# Patient Record
Sex: FEMALE | Race: BLACK OR AFRICAN AMERICAN
[De-identification: names, ages, dates, MRNs, and addresses within clinical notes are randomized per-mention and may not be internally consistent; named-entity substitution may affect disease eponyms.]

---

## 2017-03-05 NOTE — EKG
Date Performed: 03/05/2017       Time Performed: 02:24:08

 

PTAGE:      56 years

 

EKG:      Sinus rhythm 

 

 WITH OCCASIONAL SUPRAVENTRICULAR PREMATURE COMPLEXES LOW QRS VOLTAGE IN PRECORDIAL LEADS POSSIBLE AN
TERIOR MYOCARDIAL INFARCTION BORDERLINE ECG Compared to prior tracing no significant change 

 

 PREVIOUS TRACING            : 11/18/2016 01.17

 

DOCTOR:   Spencer Freeman  Interpretating Date/Time  03/05/2017 14:37:42

## 2017-03-05 NOTE — PD
HPI


Chief Complaint:  Edema


Time Seen by Provider:  02:34


Travel History


International Travel<30 days:  No


Contact w/Intl Traveler<30days:  No


Traveled to known affect area:  No





History of Present Illness


HPI


56-year-old female presents with bilateral lower extremity edema and concern 

that she has gained weight.  She states she used to be on dialysis and is not 

on that anymore.  She denies any chest pain, shortness of breath, fever or 

other concurrent complaints.  She states that she is worried about her kidneys.

  Quality is swollen.  Severity is moderate.  Location is bilateral lower legs.

  She denies specific modifying factors.  Duration is couple weeks.





PFSH


Past Medical History


Heart Rhythm Problems:  No


Cardiac Catheterization:  Yes (3)


Cardiovascular Problems:  Yes (CHF)


High Cholesterol:  No


Congestive Heart Failure:  Yes


Cerebrovascular Accident:  Yes (TIA)


Diabetes:  Yes (INSULIN DEPENDENT)


Patient Takes Glucophage:  No


Diminished Hearing:  No


Gastrointestinal Disorders:  Yes (COLONOSCOPY 3 POLYPS REMOVED)


Hypertension:  Yes


Immunizations Current:  No


Pancreatitis:  Yes


Tetanus Vaccination:  < 5 Years


Influenza Vaccination:  Yes


Pregnant?:  Not Pregnant


Menopausal:  Yes


:  3


Para:  2


:  1





Past Surgical History


Cholecystectomy:  Yes


Coronary Artery Bypass Graft:  No


Eye Surgery:  Yes (R EYE RETINA DETACHMENT in )


Hysterectomy:  Yes





Family History


Family Myocardial Infarction:  Yes





Social History


Alcohol Use:  No


Tobacco Use:  No


Substance Use:  No





Allergies-Medications


(Allergen,Severity, Reaction):  


Coded Allergies:  


     No Known Allergies (Verified , 3/5/17)


Reported Meds & Prescriptions





Reported Meds & Active Scripts


Active


Reported


Vitamin D (Cholecalciferol) 1,000 Unit Tab 1,000 Units PO DAILY


Furosemide 40 Mg Tab 40 Mg PO DAILY


Lisinopril 10 Mg Tab 10 Mg PO DAILY


Metoprolol Succinate ER 24 HR (Metoprolol Succinate) 25 Mg Tab 25 Mg PO DAILY


Fluoxetine (Fluoxetine HCl) 40 Mg Cap 40 Cap PO DAILY


Amlodipine (Amlodipine Besylate) 5 Mg Tab 5 Mg PO DAILY


Protonix (Pantoprazole Sodium) 40 Mg Tab 40 Mg PO DAILY


Gabapentin 300 Mg Cap 300 Mg PO HS


Meloxicam 15 Mg Tab 15 Mg PO DAILY


Lantus Inj (Insulin Glargine) 1,000 Unit/10 Ml Vial 55 Units SQ HS


Lantus Inj (Insulin Glargine) 1,000 Unit/10 Ml Vial 75 Units SQ AC BREAKFAST








Review of Systems


Except as stated in HPI:  all other systems reviewed are Neg





Physical Exam


Narrative


GENERAL: Well-nourished, well-developed patient.  Well-appearing


SKIN: Warm and dry.


HEAD: Normocephalic and atraumatic.


EYES: No injection or drainage. 


ENT: No nasal drainage noted. 


NECK: Supple, trachea midline.


CARDIOVASCULAR: Regular rate and rhythm 


RESPIRATORY: Breath sounds equal bilaterally. No accessory muscle use.


GASTROINTESTINAL: Abdomen soft, non-tender, nondistended. 


EXTREMITIES: Nonpitting edema bilateral lower extremities, no joint pains, 

compartments soft.


NEUROLOGICAL: Awake and alert. Motor and sensory grossly within normal limits. 

Normal speech.





Data


Data


Last Documented VS





Vital Signs








  Date Time  Temp Pulse Resp B/P Pulse Ox O2 Delivery O2 Flow Rate FiO2


 


3/5/17 02:48  97 18 144/79 94 Room Air  


 


3/4/17 22:43 98.3       








Orders





 B-Type Natriuretic Peptide (3/4/17 22:57)


Comprehensive Metabolic Panel (3/4/17 22:57)


Complete Blood Count With Diff (3/4/17 22:57)


Coag Profile (3/4/17 22:57)





Labs





 Laboratory Tests








Test 3/4/17





 23:15


 


White Blood Count 5.7 TH/MM3


 


Red Blood Count 4.32 MIL/MM3


 


Hemoglobin 12.3 GM/DL


 


Hematocrit 37.4 %


 


Mean Corpuscular Volume 86.7 FL


 


Mean Corpuscular Hemoglobin 28.6 PG


 


Mean Corpuscular Hemoglobin 33.0 %





Concent 


 


Red Cell Distribution Width 13.5 %


 


Platelet Count 253 TH/MM3


 


Mean Platelet Volume 8.7 FL


 


Neutrophils (%) (Auto) 44.4 %


 


Lymphocytes (%) (Auto) 42.1 %


 


Monocytes (%) (Auto) 7.1 %


 


Eosinophils (%) (Auto) 5.1 %


 


Basophils (%) (Auto) 1.3 %


 


Neutrophils # (Auto) 2.6 TH/MM3


 


Lymphocytes # (Auto) 2.4 TH/MM3


 


Monocytes # (Auto) 0.4 TH/MM3


 


Eosinophils # (Auto) 0.3 TH/MM3


 


Basophils # (Auto) 0.1 TH/MM3


 


CBC Comment DIFF FINAL 


 


Differential Comment  


 


Prothrombin Time 10.6 SEC


 


Prothromb Time International 1.0 RATIO





Ratio 


 


Activated Partial 25.0 SEC





Thromboplast Time 


 


Sodium Level 142 MEQ/L


 


Potassium Level 4.2 MEQ/L


 


Chloride Level 104 MEQ/L


 


Carbon Dioxide Level 32.2 MEQ/L


 


Anion Gap 6 MEQ/L


 


Blood Urea Nitrogen 17 MG/DL


 


Creatinine 1.07 MG/DL


 


Estimat Glomerular Filtration 64 ML/MIN





Rate 


 


Random Glucose 218 MG/DL


 


Calcium Level 8.9 MG/DL


 


Total Bilirubin 0.3 MG/DL


 


Aspartate Amino Transf 30 U/L





(AST/SGOT) 


 


Alanine Aminotransferase 41 U/L





(ALT/SGPT) 


 


Alkaline Phosphatase 100 U/L


 


B-Type Natriuretic Peptide 7 PG/ML


 


Total Protein 7.8 GM/DL


 


Albumin 3.5 GM/DL











MDM


Medical Decision Making


Medical Screen Exam Complete:  Yes


Emergency Medical Condition:  Yes


Medical Record Reviewed:  Yes (past history confirmed)


Interpretation(s)


CBC & BMP Diagram


3/4/17 23:15








Differential Diagnosis


Acute renal failure, anemia, heart failure


Narrative Course


Blood work from triage is normal including BNP other than moderately elevated 

glucose in the 200s with normal bicarbonate and minimally elevated creatinine 

which has improved from prior.  Lengthy discussion with patient and she agrees 

to further testing as an outpatient.  Given return instructions.





Diagnosis





 Primary Impression:  


 Bilateral lower extremity edema


Patient Instructions:  General Instructions





***Additional Instructions:


Elevate legs at rest, return as needed, follow with primary Monday


***Med/Other Pt SpecificInfo:  No Change to Meds


Disposition:  01 DISCHARGE HOME


Condition:  Stable








Nikki Mosley MD Mar 5, 2017 03:33

## 2017-08-26 NOTE — PD
HPI


Chief Complaint:  Chest Pain


Time Seen by Provider:  01:33


Travel History


International Travel<30 days:  No


Contact w/Intl Traveler<30days:  No


Traveled to known affect area:  No





History of Present Illness


HPI


56-year-old female patient with history of multiple medical issues, CHF, 

presents to the ER today with one-day history of substernal chest pains which 

she currently measures at a 5 out of 10 and shortness of breath.  She states 

her chest feels heavy.  She denies any fevers, coughing, or other symptoms.  

She is on Lasix and last took it this morning.  Symptoms worsen with laying 

down.





Modifying Factors: None


Associated Signs & Symptoms: Chest pressure and shortness of breath


Risk Factors: CHF history





PFSH


Past Medical History


Hx Anticoagulant Therapy:  No


Heart Rhythm Problems:  No


Cardiac Catheterization:  Yes (3)


Cardiovascular Problems:  Yes (MI, HTN, CAD, CHF)


High Cholesterol:  No


Congestive Heart Failure:  Yes


Cerebrovascular Accident:  Yes (TIA)


Diabetes:  Yes


Patient Takes Glucophage:  No


Diminished Hearing:  No


Gastrointestinal Disorders:  Yes (COLONOSCOPY 3 POLYPS REMOVED)


Hypertension:  Yes


Immunizations Current:  No


Pancreatitis:  Yes


Menopausal:  Yes


:  3


Para:  2


:  1





Past Surgical History


Cholecystectomy:  Yes


Coronary Artery Bypass Graft:  No


Eye Surgery:  Yes (R EYE RETINA DETACHMENT in )


Hysterectomy:  Yes





Family History


Family Myocardial Infarction:  Yes





Social History


Alcohol Use:  No


Tobacco Use:  No


Substance Use:  No





Allergies-Medications


(Allergen,Severity, Reaction):  


Coded Allergies:  


     No Known Allergies (Verified , 17)


Reported Meds & Prescriptions





Reported Meds & Active Scripts


Active


Reported


Vitamin D3 (Cholecalciferol) 1,000 Unit Tab 1,000 Units PO DAILY


Furosemide 40 Mg Tab 40 Mg PO DAILY


Lisinopril 10 Mg Tab 10 Mg PO DAILY


Metoprolol Succinate ER 24 HR (Metoprolol Succinate) 25 Mg Tab 25 Mg PO DAILY


Fluoxetine (Fluoxetine HCl) 40 Mg Cap 40 Cap PO DAILY


Amlodipine (Amlodipine Besylate) 5 Mg Tab 5 Mg PO DAILY


Protonix (Pantoprazole Sodium) 40 Mg Tab 40 Mg PO DAILY


Gabapentin 300 Mg Cap 300 Mg PO HS


Meloxicam 15 Mg Tab 15 Mg PO DAILY


Lantus Inj (Insulin Glargine) 1,000 Unit/10 Ml Vial 55 Units SQ HS


Lantus Inj (Insulin Glargine) 1,000 Unit/10 Ml Vial 75 Units SQ AC BREAKFAST








Review of Systems


Except as stated in HPI:  all other systems reviewed are Neg





Physical Exam


Narrative


GENERAL: Well-developed middle age -American female patient in mild 

distress.  Awake and oriented 3.


SKIN: Focused skin assessment warm/dry.


HEAD: Atraumatic. Normocephalic. 


EYES: Pupils equal and round. No scleral icterus. No injection or drainage. 


ENT: No nasal bleeding or discharge.  Mucous membranes pink and moist.


NECK: Trachea midline. No JVD. 


CARDIOVASCULAR: Regular rate and rhythm.  No murmur appreciated.  Pulses are 

present and equal bilaterally.


RESPIRATORY: No accessory muscle use. Clear to auscultation. Breath sounds 

equal bilaterally. 


GASTROINTESTINAL: Abdomen soft, non-tender, nondistended. Hepatic and splenic 

margins not palpable. 


MUSCULOSKELETAL: No obvious deformities. No clubbing.  No cyanosis.  No edema. 


NEUROLOGICAL: Awake and alert. No obvious cranial nerve deficits.  Motor 

grossly within normal limits. Normal speech.


PSYCHIATRIC: Appropriate mood and affect; insight and judgment normal.





Data


Data


Last Documented VS





Vital Signs








  Date Time  Temp Pulse Resp B/P (MAP) Pulse Ox O2 Delivery O2 Flow Rate FiO2


 


17 02:50  80 20 152/80 (104) 99 Room Air  


 


17 01:23 98.8       








Orders





 Orders


Complete Blood Count With Diff (17 01:33)


Comprehensive Metabolic Panel (17 01:33)


B-Type Natriuretic Peptide (17 01:33)


Act Partial Throm Time (Ptt) (17 01:33)


Prothrombin Time / Inr (Pt) (17 01:33)


Ckmb (Isoenzyme) Profile (17 01:33)


Troponin I (17 01:33)


Iv Access Insert/Monitor (17:33)


Ecg Monitoring (17:33)


Oximetry (17:33)


Oxygen Administration (17 01:33)


Chest, Single Ap (17 01:33)


Sodium Chloride 0.9% Flush (Ns Flush) (17 01:45)


Furosemide Inj (Lasix Inj) (17 01:45)


Aspirin (Aspirin) (17 01:45)


Nitroglycerin 2% Oint (Nitroglycerin 2% (17 01:45)


CKMB (17 01:40)


CKMB% (17 01:40)


Admit Order (Ed Use Only) (17 03:17)


Activity Bed Rest With Brp (17 03:17)


Vital Signs (Adult) Q4H (17 03:17)


Cardiac Rhythm .As Directed (17 03:17)


Notify Dr: Other .PRN (17 03:17)


Notify DrChen Parameters (17 03:17)


Resp Oxygen Nasal Cannula (17 )


Diet Heart Healthy (17 Breakfast)


Ckmb (Isoenzyme) Profile (17 03:17)


Ckmb (Isoenzyme) Profile (17 06:17)


Troponin I (17 03:17)


Troponin I (17 06:17)


Electrocardiogram (17 03:17)


Electrocardiogram (17 06:17)


^ Obtain (17 03:17)


Sodium Chloride 0.9% Flush (Ns Flush) (17 03:30)


Sodium Chloride 0.9% Flush (Ns Flush) (17 09:00)


Cardiac Monitor / Telemetry [id number].[id number] (17 03:17)


CKMB (17 04:43)


CKMB% (17 04:43)





Labs





Laboratory Tests








Test


  17


01:40


 


White Blood Count 5.3 TH/MM3 


 


Red Blood Count 4.32 MIL/MM3 


 


Hemoglobin 12.5 GM/DL 


 


Hematocrit 38.2 % 


 


Mean Corpuscular Volume 88.5 FL 


 


Mean Corpuscular Hemoglobin 28.9 PG 


 


Mean Corpuscular Hemoglobin


Concent 32.6 % 


 


 


Red Cell Distribution Width 13.7 % 


 


Platelet Count 276 TH/MM3 


 


Mean Platelet Volume 8.1 FL 


 


Neutrophils (%) (Auto) 39.2 % 


 


Lymphocytes (%) (Auto) 41.3 % 


 


Monocytes (%) (Auto) 12.6 % 


 


Eosinophils (%) (Auto) 6.3 % 


 


Basophils (%) (Auto) 0.6 % 


 


Neutrophils # (Auto) 2.1 TH/MM3 


 


Lymphocytes # (Auto) 2.2 TH/MM3 


 


Monocytes # (Auto) 0.7 TH/MM3 


 


Eosinophils # (Auto) 0.3 TH/MM3 


 


Basophils # (Auto) 0.0 TH/MM3 


 


CBC Comment DIFF FINAL 


 


Differential Comment  


 


Prothrombin Time 10.3 SEC 


 


Prothromb Time International


Ratio 0.9 RATIO 


 


 


Activated Partial


Thromboplast Time 24.0 SEC 


 


 


Blood Urea Nitrogen 23 MG/DL 


 


Creatinine 1.36 MG/DL 


 


Random Glucose 185 MG/DL 


 


Total Protein 7.7 GM/DL 


 


Albumin 3.3 GM/DL 


 


Calcium Level 8.6 MG/DL 


 


Alkaline Phosphatase 107 U/L 


 


Aspartate Amino Transf


(AST/SGOT) 52 U/L 


 


 


Alanine Aminotransferase


(ALT/SGPT) 61 U/L 


 


 


Total Bilirubin 0.3 MG/DL 


 


Sodium Level 138 MEQ/L 


 


Potassium Level 4.1 MEQ/L 


 


Chloride Level 103 MEQ/L 


 


Carbon Dioxide Level 28.5 MEQ/L 


 


Anion Gap 7 MEQ/L 


 


Estimat Glomerular Filtration


Rate 48 ML/MIN 


 


 


Total Creatine Kinase 171 U/L 


 


Creatine Kinase MB 3.2 NG/ML 


 


Troponin I


  LESS THAN 0.02


NG/ML


 


B-Type Natriuretic Peptide 10 PG/ML 











MDM


Medical Decision Making


Medical Screen Exam Complete:  Yes


Emergency Medical Condition:  Yes


Medical Record Reviewed:  Yes


Interpretation(s)


EKG shows NSR, no ST elevation or depression, and no arrhythmias.  No  

significant T-wave inversions.








Laboratory Tests








Test


  17


01:40


 


Monocytes (%) (Auto)


  12.6 %


(0.0-8.0)


 


Eosinophils (%) (Auto)


  6.3 %


(0.0-4.0)


 


Activated Partial


Thromboplast Time 24.0 SEC


(24.3-30.1)


 


Blood Urea Nitrogen


  23 MG/DL


(7-18)


 


Creatinine


  1.36 MG/DL


(0.50-1.00)


 


Random Glucose


  185 MG/DL


()


 


Albumin


  3.3 GM/DL


(3.4-5.0)


 


Aspartate Amino Transf


(AST/SGOT) 52 U/L (15-37) 


 


 


Alanine Aminotransferase


(ALT/SGPT) 61 U/L (10-53) 


 


 


Estimat Glomerular Filtration


Rate 48 ML/MIN


(>89)


 


Troponin I


  LESS THAN 0.02


NG/ML











Last 24 hours Impressions








Chest X-Ray 17 [id number] Signed





Impressions: 





 Service Date/Time:  2017 01:50 - CONCLUSION: Normal 





 examination.       Patric Valle MD 








Differential Diagnosis


CHF versus pneumonia versus dysrhythmias versus ACS


Narrative Course


Chest x-ray did not indicate any underlying pneumonia.  Cardiac enzymes are 

negative.  Patient was given aspirin and nitroglycerin with some relief of 

chest discomfort.  Vital signs are stable in the ER.  CTA was negative for PE.  

At this point, my plan would be to admit her for further evaluation of chest 

pain.





Diagnosis





 Primary Impression:  


 Chest pain





Admitting Information


Admitting Physician Requests:  Admit











Davon Edward MD Aug 26, 2017 02:42

## 2017-08-26 NOTE — RADRPT
EXAM DATE/TIME:  08/26/2017 01:50 

 

[facility] COMPARISON:     

CHEST SINGLE AP, November 08, 2016, 23:15.

 

                     

INDICATIONS :     

Chest pain.

                     

 

MEDICAL HISTORY :     

Hypertension.  Diabetes mellitus type II.        

 

SURGICAL HISTORY :     

None.   

 

ENCOUNTER:     

Initial                                        

 

ACUITY:     

1 day      

 

PAIN SCORE:     

7/10

 

LOCATION:     

Bilateral chest 

 

FINDINGS:     

A single view of the chest demonstrates the lungs to be symmetrically aerated without evidence of mas
s, infiltrate or effusion.  The cardiomediastinal contours are unremarkable.  Osseous structures are 
intact.

 

CONCLUSION:     Normal examination.  

 

 

 

 Patric Valle MD on August 26, 2017 at 1:52           

Board Certified Radiologist.

 This report was verified electronically.

## 2017-08-26 NOTE — RADRPT
EXAM DATE/TIME:  08/26/2017 04:16 

 

[facility] COMPARISON:     

No previous studies available for comparison.

 

 

INDICATIONS :     

Chest pain with shortness of breath.

                      

 

IV CONTRAST:     

70 cc Omnipaque 350 (iohexol) IV 

 

 

RADIATION DOSE:     

23.23 CTDIvol (mGy) 

 

 

MEDICAL HISTORY :     

Cardiovascular disease. Hypertension. Diabetes mellitus type 2.

 

SURGICAL HISTORY :      

None. 

 

ENCOUNTER:      

Initial

 

ACUITY:      

1 day

 

PAIN SCALE:      

8/10

 

LOCATION:       

Bilateral chest 

 

TECHNIQUE:     

Volumetric scanning of the chest was performed using a pulmonary embolism protocol MIP images were re
constructed.  Using automated exposure control and adjustment of the mA and/or kV according to patien
t size, radiation dose was kept as low as reasonably achievable to obtain optimal diagnostic quality 
images.   DICOM format image data is available electronically for review and comparison.  

 

Follow-up recommendations for detected pulmonary nodules are based at a minimum on nodule size and pa
tient risk factors according to Fleischner Society Guidelines.

 

FINDINGS:     

 

PULMONARY ARTERIES:

No filling defects are seen in the pulmonary arteries through the segmental level.

 

LUNGS:     

There is no consolidation or pneumothorax .  No concerning pulmonary nodule is visualized.

 

PLEURAE:     

There is no pleural thickening or pleural effusion.

 

MEDIASTINUM:     

There is good visualization of the great vessels of the middle mediastinum.  No evidence of mediastin
al or hilar adenopathy/mass.

 

MUSCULOSKELETAL:     

Within normal limits for patient age.

 

MISCELLANEOUS:     

The visualized upper abdominal organs demonstrate no acute abnormality.

 

CONCLUSION:     

Normal examination.  

 

 

 

 

 Patric Valle MD on August 26, 2017 at 4:41           

Board Certified Radiologist.

 This report was verified electronically.

## 2017-08-26 NOTE — RADRPT
EXAM DATE/TIME:  08/26/2017 12:28 

 

[facility] COMPARISON:     

MYOCARDIAL PERF PHARM SPECT, GATED W/EF, May 29, 2016, 10:52.

 

 

INDICATIONS :     

Substernal chest pain. Angina. Coronary artery disease.

                           

 

DOSE:     

35.0 mCi Tc99m Myoview at stress.

                     11.0 mCi Tc99m Myoview at rest.

                     0.4 mg Lexiscan

                       

 

 

STRESS SYMPTOMS:      

Heart racing and warm.

                       

 

 

EJECTION FRACTION:       

59%

                       

 

MEDICAL HISTORY :     

Myocardial infarction. Congestive heart failure. Diabetes mellitus type 2. 

 

SURGICAL HISTORY :         

Cardiac cath.

 

ENCOUNTER:     

Initial

 

ACUITY:     

1 day

 

PAIN SCALE:     

5/10

 

LOCATION:      

Substernal chest 

 

TECHNIQUE:     

The patient underwent pharmacologic stress with infusion of prescribed dose.  Continuous ECG tracing 
was monitored during stress.  Gated SPECT imaging was performed after stress and conventional SPECT i
maging was performed at rest.  The examination was performed on a SPECT/CT scanner, both attenuation 
and non-corrected datasets were reviewed.

 

FINDINGS:     

 

DISTRIBUTION:     

The maximum perfused segment at stress is in the anterior wall.

 

PERFUSION STUDY:     

The pattern of perfusion at stress is within normal limits.

 

GATED STUDY:     

There is intact wall motion and thickening without hypokinetic or dyskinetic segments. 

 

CONCLUSION:     

No definitive reversible perfusion defects are seen to suggest stress-induced myocardial ischemia.

RISK CATEGORY:     Low (less than 1% annual mortality rate)

 

 

 

 Samira Forrester MD on August 26, 2017 at 14:22           

Board Certified Radiologist.

 This report was verified electronically.

## 2017-08-26 NOTE — HHI.DCPOC
Discharge Care Plan


Diagnosis:  


(1) Weakness of both lower extremities


(2) Atypical chest pain


(3) Fatigue


(4) Hypertension


(5) Renal insufficiency


(6) DM (diabetes mellitus)


Goals to Promote Your Health


* To prevent worsening of your condition and complications


* To maintain your health at the optimal level


Directions to Meet Your Goals


*** Take your medications as prescribed


*** Follow your dietary instruction


*** Follow activity as directed








*** Keep your appointments as scheduled


*** Take your immunizations and boosters as scheduled


*** If your symptoms worsen call your PCP, if no PCP go to Urgent Care Center 

or Emergency Room***


*** Smoking is Dangerous to Your Health. Avoid second hand smoke***


***Call the 24-hour hour crisis hotline for domestic abuse at 1-466.901.8681***











Pinky Anderson Aug 26, 2017 15:50

## 2017-08-26 NOTE — HHI.HP
HPI


Primary Care Physician


Myriam Knight M.D.


Chief Complaint


Chest pain


History of Present Illness


56-year-old female with history of CHF, hypertension, diabetes type 2, and 

renal insufficiency presents to emergency room for further evaluation.  Onset 

last evening.  Location substernal.  Characterized as tightness.  

Nonexertional.  Associated symptoms included nausea and shortness of breath.  

Duration one hour.  Also reports feeling fatigued for weeks.  Denies similar 

pain in the past.  No known precipitating or relieving factors.





Review of Systems


General: Fatigue for many weeks. Weakness in legs, occurs suddenly, reports 

falling last week due to this. No fever, chills, or recent illness. Currently 

taking antibiotics for recent tooth extraction.


HEENT: No HA, no vision changes, no nasal congestion or drainage. 


CV: As stated above. No current CP or pressure. No palpitations or dizziness


RESP: Becomes SOB on exertional for "many months." No SOB at rest, cough, wheeze

, recent URI, or history of asthma


GI: No nausea, vomiting, bowel changes, diarrhea, constipation, pain, distention

, melena, or blood in the stool.  Weight fluctuates, reports taking daily 

weights as instructed by PCP.  No sudden increase in weight.


: No dysuria. History of stage III kidney failure. Follows with a 

nephrologist. No incontinence. 


EXT: Occasional anasarca, last episode last week, Lasix generally resolves her 

edema. Rare feelings of bilateral lower extremity neuropathy. 


MS: No discomfort or change in ROM


NEURO: No difficulty with balance, reports "my legs give out without notice." 

No syncope episodes or LOC. Last episode occurred last week. Reports falling 

more often lately and has reported this to her PCP.


PSYCH: No anxiety, depression, or suicidal ideation





Past Family Social History


Allergies:  


Coded Allergies:  


     No Known Allergies (Verified , 17)


Past Medical History


CHF, hypertension, TIA, diabetes type 2, GERD, renal insufficiency


Past Surgical History


Cholecystectomy, right retinal detachment surgery


Reported Medications


Active


Reported


Vitamin D3 (Cholecalciferol) 1,000 Unit Tab 1,000 Units PO DAILY


Furosemide 40 Mg Tab 40 Mg PO DAILY


Lisinopril 10 Mg Tab 10 Mg PO DAILY


Metoprolol Succinate ER 24 HR (Metoprolol Succinate) 25 Mg Tab 25 Mg PO DAILY


Fluoxetine (Fluoxetine HCl) 40 Mg Cap 40 Cap PO DAILY


Amlodipine (Amlodipine Besylate) 5 Mg Tab 5 Mg PO DAILY


Protonix (Pantoprazole Sodium) 40 Mg Tab 40 Mg PO DAILY


Gabapentin 300 Mg Cap 300 Mg PO HS


Meloxicam 15 Mg Tab 15 Mg PO DAILY


Lantus Inj (Insulin Glargine) 1,000 Unit/10 Ml Vial 55 Units SQ HS


Lantus Inj (Insulin Glargine) 1,000 Unit/10 Ml Vial 75 Units SQ AC BREAKFAST


Active Ordered Medications





Current Medications








 Medications


  (Trade)  Dose


 Ordered  Sig/Nazia


 Route  Start Time


 Stop Time Status Last Admin


 


  (NS Flush)  2 ml  UNSCH  PRN


 IVF  17 01:45


     


 


 


  (NS Flush)  2 ml  UNSCH  PRN


 IV FLUSH  17 03:30


     


 


 


  (NS Flush)  2 ml  BID


 IV FLUSH  17 09:00


     


 


 


  (Tylenol)  500 mg  Q4H  PRN


 PO  17 08:15


     


 


 


  (Zofran Inj)  4 mg  Q6H  PRN


 IV  17 08:15


     


 


 


  (Nitrostat Sl)  0.4 mg  Q5M  PRN


 SL  17 08:15


     


 


 


  (Aspirin)  325 mg  DAILY


 PO  17 09:00


     


 








Family History


Sister  from MI age 58.


Social History


Known diabetes type 2 and hypertension.  No known hyperlipidemia, also not 

taken statin therapy with diabetes diagnosis.


Lifelong nonsmoker.  Denies any alcohol or illegal drug use.





Past cardiac testing


Reports stress test, EKG, and echocardiogram completed last week in [place], has appointment scheduled for 2017 for results.


2016 Lexiscan-unremarkable for ischemia.


Cardiac catheterizations 3-reported all to be normal.  Most recent  

cardiac catheterization. 


2011- left heart catheterization (Dr. Sibley)- conclusion left main, no 

angiographic disease.  Left anterior descending is large and long vessel, wraps 

around the apex, with no angiographic obstructive disease.  Large ramus 

intermedius branch, ostial 20% stenosis.  Co-dominant circumflex and no 

angiographic disease.  Co-dominant RCA, proximal 30% stenosis.  Recommendations 

continue medical management.  In review of unremarkable coronary angiogram, the 

stress test can be interpreted as a false positive or attenuation artifact.





Physical Exam


Vital Signs





Vital Signs








  Date Time  Temp Pulse Resp B/P (MAP) Pulse Ox O2 Delivery O2 Flow Rate FiO2


 


17 05:54  76      


 


17 05:30 98.3 81 18 105/58 (74) 98   


 


17 03:56  78 20 154/82 (106) 97 Room Air  


 


17 03:29     94   


 


17 02:50  80 20 152/80 (104) 99 Room Air  


 


17 01:26  73 24  94 Room Air  


 


17 01:23 98.8 76 16 148/72 (97) 100   


 


17 01:11 98.7 82 24 159/87 (111) 98 Room Air  








Physical Exam


GENERAL: Alert WN, WD, NAD, pleasant, obese  female


CV: RRR, without murmur, rub, gallop, no JVD, S1-S2 no S3-S4.  


RESP: Clear lungs throughout bilateral, no crackles, wheeze, rhonchi, 

symmetrical chest rise, nonlabored, able to speak in full sentences


ABD: Soft, NT, ND, no masses, positive bowel tones


EXT: Pulses +24, trace dependent edema


MS: Normal tone 4 extremities, nontender, no obvious deformities, full range 

of motion


NEURO: CN II through CN XII grossly intact, motor strength 5/5


PSYCH: A+O 3, pleasant affect, appropriate speech, appropriate mood and affect

, insight and judgment


SKIN: Normal turgor, normal texture


Laboratory





Laboratory Tests








Test


  17


01:40 17


04:43 17


07:45


 


White Blood Count 5.3   


 


Red Blood Count 4.32   


 


Hemoglobin 12.5   


 


Hematocrit 38.2   


 


Mean Corpuscular Volume 88.5   


 


Mean Corpuscular Hemoglobin 28.9   


 


Mean Corpuscular Hemoglobin


Concent 32.6 


  


  


 


 


Red Cell Distribution Width 13.7   


 


Platelet Count 276   


 


Mean Platelet Volume 8.1   


 


Neutrophils (%) (Auto) 39.2   


 


Lymphocytes (%) (Auto) 41.3   


 


Monocytes (%) (Auto) 12.6   


 


Eosinophils (%) (Auto) 6.3   


 


Basophils (%) (Auto) 0.6   


 


Neutrophils # (Auto) 2.1   


 


Lymphocytes # (Auto) 2.2   


 


Monocytes # (Auto) 0.7   


 


Eosinophils # (Auto) 0.3   


 


Basophils # (Auto) 0.0   


 


CBC Comment DIFF FINAL   


 


Differential Comment    


 


Prothrombin Time 10.3   


 


Prothromb Time International


Ratio 0.9 


  


  


 


 


Activated Partial


Thromboplast Time 24.0 


  


  


 


 


Blood Urea Nitrogen 23   


 


Creatinine 1.36   


 


Random Glucose 185   


 


Total Protein 7.7   


 


Albumin 3.3   


 


Calcium Level 8.6   


 


Alkaline Phosphatase 107   


 


Aspartate Amino Transf


(AST/SGOT) 52 


  


  


 


 


Alanine Aminotransferase


(ALT/SGPT) 61 


  


  


 


 


Total Bilirubin 0.3   


 


Sodium Level 138   


 


Potassium Level 4.1   


 


Chloride Level 103   


 


Carbon Dioxide Level 28.5   


 


Anion Gap 7   


 


Estimat Glomerular Filtration


Rate 48 


  


  


 


 


Total Creatine Kinase 171  156  


 


Creatine Kinase MB 3.2  3.0  


 


Troponin I LESS THAN 0.02  LESS THAN 0.02  


 


B-Type Natriuretic Peptide 10   








Result Diagram:  


17





Imaging





Last Impressions








CT Angiography 17 Signed





Impressions: 





 Service Date/Time:  2017 04:16 - CONCLUSION:  Normal 





 examination.        Patric Valle MD 


 


Chest X-Ray 17 0133 Signed





Impressions: 





 Service Date/Time:  2017 01:50 - CONCLUSION: Normal 





 examination.       Patric Valle MD 








Course


EKG


Normal sinus rhythm, left axis deviation, nonspecific T-wave changes





Caprini VTE Risk Assessment


Caprini VTE Risk Assessment:  No/Low Risk (score <= 1)


Caprini Risk Assessment Model











 Point Value = 1          Point Value = 2  Point Value = 3  Point Value = 5


 


Age 41-60


Minor surgery


BMI > 25 kg/m2


Swollen legs


Varicose veins


Pregnancy or postpartum


History of unexplained or recurrent


   spontaneous 


Oral contraceptives or hormone


   replacement


Sepsis (< 1 month)


Serious lung disease, including


   pneumonia (< 1 month)


Abnormal pulmonary function


Acute myocardial infarction


Congestive heart failure (< 1 month)


History of inflammatory bowel disease


Medical patient at bed rest Age 61-74


Arthroscopic surgery


Major open surgery (> 45 min)


Laparoscopic surgery (> 45 min)


Malignancy


Confined to bed (> 72 hours)


Immobilizing plaster cast


Central venous access Age >= 75


History of VTE


Family history of VTE


Factor V Leiden


Prothrombin 83693A


Lupus anticoagulant


Anticardiolipin antibodies


Elevated serum homocysteine


Heparin-induced thrombocytopenia


Other congenital or acquired


   thrombophilia Stroke (< 1 month)


Elective arthroplasty


Hip, pelvis, or leg fracture


Acute spinal cord injury (< 1 month)








Prophylaxis Regimen











   Total Risk


Factor Score Risk Level Prophylaxis Regimen


 


0-1      Low Early ambulation


 


2 Moderate Order ONE of the following:


*Sequential Compression Device (SCD)


*Heparin 5000 units SQ BID


 


3-4 Higher Order ONE of the following medications:


*Heparin 5000 units SQ TID


*Enoxaparin/Lovenox 40 mg SQ daily (WT < 150 kg, CrCl > 30 mL/min)


*Enoxaparin/Lovenox 30 mg SQ daily (WT < 150 kg, CrCl > 10-29 mL/min)


*Enoxaparin/Lovenox 30 mg SQ BID (WT < 150 kg, CrCl > 30 mL/min)


AND/OR


*Sequential Compression Device (SCD)


 


5 or more Highest Order ONE of the following medications:


*Heparin 5000 units SQ TID (Preferred with Epidurals)


*Enoxaparin/Lovenox 40 mg SQ daily (WT < 150 kg, CrCl > 30 mL/min)


*Enoxaparin/Lovenox 30 mg SQ daily (WT < 150 kg, CrCl > 10-29 mL/min)


*Enoxaparin/Lovenox 30 mg SQ BID (WT < 150 kg, CrCl > 30 mL/min)


AND


*Sequential Compression Device (SCD)











Assessment and Plan


Assessment and Plan


#1 Chest pain-admitted to chest pain center.  Ruled out with 3 sets of EKGs, 

cardiac enzymes, monitor overnight.  Seen and evaluated by Dr. Vijay Baxter.  

Proceed with chemical stress test due to reported recent stress testing results 

not available for review.  If stress test unremarkable, will discharge later 

this afternoon.  Instructed to keep follow-up appointment with cardiologist on .


#2 Diabetes-SSI moderate dose, continue Lantus, encouraged increasing her daily 

activity as able, follow up with PCP


#3 Hypertension-continue amlodipine and lisinopril, encouraged low sodium diet


#4 CHF-continue metoprolol and furosemide, no signs of congestive heart failure 

at this time, keep follow up appointment with cardiologist. 


#5 Renal insufficiency-appears to be at baseline, follow with nephrology as 

previously instructed


#6 Generalized leg weakness-encouraged her to follow up with PCP, calling on 

Monday to move her appointment to possible earlier appointment


All other home medications will be reviewed and ordered as appropriate while in 

chest pain center.











Pinky Anderson Aug 26, 2017 08:42

## 2017-08-28 NOTE — EKG
Date Performed: 08/26/2017       Time Performed: 08:03:19

 

PTAGE:      56 years

 

EKG:      Sinus rhythm 

 

 BORDERLINE LEFT AXIS DEVIATION LOW QRS VOLTAGE IN PRECORDIAL LEADS NONSPECIFIC T-WAVE ABNORMALITY AB
NORMAL ECG

 

PREVIOUS TRACING       : 08/26/2017 05.37 Since previous tracing, no significant change noted

 

DOCTOR:   Vijay Baxter  Interpretating Date/Time  08/28/2017 11:07:17

## 2017-08-28 NOTE — EKG
Date Performed: 08/26/2017       Time Performed: 01:21:46

 

PTAGE:      56 years

 

EKG:      Sinus rhythm 

 

 BORDERLINE LEFT AXIS DEVIATION NONSPECIFIC T-WAVE ABNORMALITY BORDERLINE ECG

 

PREVIOUS TRACING       : 03/05/2017 02.24 Since previous tracing, no significant change noted

 

DOCTOR:   Vijay Baxter  Interpretating Date/Time  08/29/2017 06:49:15

## 2017-08-28 NOTE — EKG
Date Performed: 08/26/2017       Time Performed: 05:37:36

 

PTAGE:      56 years

 

EKG:      Sinus rhythm 

 

 BORDERLINE LEFT AXIS DEVIATION NONSPECIFIC T-WAVE ABNORMALITY BORDERLINE ECG

 

PREVIOUS TRACING       : 03/05/2017 02.24 Since previous tracing, no significant change noted

 

DOCTOR:   Vijay Baxter  Interpretating Date/Time  08/28/2017 11:08:03

## 2017-08-28 NOTE — TR
Date Performed: 08/26/2017       Time Performed: 13:17:57

 

DOCTOR:      Vijay Baxter 

 

DRUG LIST:     

CLINICAL HISTORY:     

REASON FOR TEST:     

REASON FOR ENDING:     

OBSERVATION:     

CONCLUSION:      Lexiscan stress test was performed under standard four minute protocol.  Radionuclid
e was injected one minute prior to ending the test. No electrocardiographic abormalities were present
 to suggest ischemia. Nuclear imaging and interpretation are pending.

COMMENTS:

## 2018-03-12 ENCOUNTER — HOSPITAL ENCOUNTER (INPATIENT)
Dept: HOSPITAL 17 - HSDC | Age: 58
LOS: 5 days | Discharge: HOME | DRG: 469 | End: 2018-03-17
Attending: ORTHOPAEDIC SURGERY | Admitting: ORTHOPAEDIC SURGERY
Payer: COMMERCIAL

## 2018-03-12 VITALS
SYSTOLIC BLOOD PRESSURE: 114 MMHG | OXYGEN SATURATION: 96 % | TEMPERATURE: 97.4 F | DIASTOLIC BLOOD PRESSURE: 59 MMHG | HEART RATE: 94 BPM | RESPIRATION RATE: 18 BRPM

## 2018-03-12 VITALS
TEMPERATURE: 97.5 F | OXYGEN SATURATION: 96 % | DIASTOLIC BLOOD PRESSURE: 65 MMHG | SYSTOLIC BLOOD PRESSURE: 130 MMHG | RESPIRATION RATE: 18 BRPM | HEART RATE: 89 BPM

## 2018-03-12 VITALS — BODY MASS INDEX: 45.99 KG/M2 | WEIGHT: 293 LBS | HEIGHT: 67 IN

## 2018-03-12 VITALS
TEMPERATURE: 96.9 F | DIASTOLIC BLOOD PRESSURE: 60 MMHG | SYSTOLIC BLOOD PRESSURE: 138 MMHG | HEART RATE: 98 BPM | OXYGEN SATURATION: 92 % | RESPIRATION RATE: 17 BRPM

## 2018-03-12 VITALS — OXYGEN SATURATION: 96 %

## 2018-03-12 VITALS — HEART RATE: 85 BPM

## 2018-03-12 DIAGNOSIS — Z80.9: ICD-10-CM

## 2018-03-12 DIAGNOSIS — J18.9: ICD-10-CM

## 2018-03-12 DIAGNOSIS — I13.0: ICD-10-CM
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
## 2018-03-12 DIAGNOSIS — N18.3: ICD-10-CM

## 2018-03-12 DIAGNOSIS — F32.9: ICD-10-CM
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
## 2018-03-12 DIAGNOSIS — Z83.3: ICD-10-CM

## 2018-03-12 DIAGNOSIS — R09.02: ICD-10-CM

## 2018-03-12 DIAGNOSIS — M17.11: Primary | ICD-10-CM

## 2018-03-12 DIAGNOSIS — I25.10: ICD-10-CM

## 2018-03-12 DIAGNOSIS — Z90.710: ICD-10-CM

## 2018-03-12 DIAGNOSIS — E03.9: ICD-10-CM

## 2018-03-12 DIAGNOSIS — Z84.2: ICD-10-CM
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
## 2018-03-12 DIAGNOSIS — Z79.4: ICD-10-CM

## 2018-03-12 DIAGNOSIS — K59.03: ICD-10-CM

## 2018-03-12 DIAGNOSIS — I50.32: ICD-10-CM

## 2018-03-12 DIAGNOSIS — T40.605A: ICD-10-CM

## 2018-03-12 DIAGNOSIS — E11.22: ICD-10-CM

## 2018-03-12 DIAGNOSIS — Z82.49: ICD-10-CM

## 2018-03-12 PROCEDURE — 71275 CT ANGIOGRAPHY CHEST: CPT

## 2018-03-12 PROCEDURE — 87070 CULTURE OTHR SPECIMN AEROBIC: CPT

## 2018-03-12 PROCEDURE — 80048 BASIC METABOLIC PNL TOTAL CA: CPT

## 2018-03-12 PROCEDURE — 86901 BLOOD TYPING SEROLOGIC RH(D): CPT

## 2018-03-12 PROCEDURE — 83605 ASSAY OF LACTIC ACID: CPT

## 2018-03-12 PROCEDURE — 86900 BLOOD TYPING SEROLOGIC ABO: CPT

## 2018-03-12 PROCEDURE — 85379 FIBRIN DEGRADATION QUANT: CPT

## 2018-03-12 PROCEDURE — 87205 SMEAR GRAM STAIN: CPT

## 2018-03-12 PROCEDURE — 76937 US GUIDE VASCULAR ACCESS: CPT

## 2018-03-12 PROCEDURE — C1776 JOINT DEVICE (IMPLANTABLE): HCPCS

## 2018-03-12 PROCEDURE — 0SRC0J9 REPLACEMENT OF RIGHT KNEE JOINT WITH SYNTHETIC SUBSTITUTE, CEMENTED, OPEN APPROACH: ICD-10-PCS | Performed by: ORTHOPAEDIC SURGERY

## 2018-03-12 PROCEDURE — 83880 ASSAY OF NATRIURETIC PEPTIDE: CPT

## 2018-03-12 PROCEDURE — C9290 INJ, BUPIVACAINE LIPOSOME: HCPCS

## 2018-03-12 PROCEDURE — 72170 X-RAY EXAM OF PELVIS: CPT

## 2018-03-12 PROCEDURE — 85025 COMPLETE CBC W/AUTO DIFF WBC: CPT

## 2018-03-12 PROCEDURE — L1830 KO IMMOB CANVAS LONG PRE OTS: HCPCS

## 2018-03-12 PROCEDURE — 71045 X-RAY EXAM CHEST 1 VIEW: CPT

## 2018-03-12 PROCEDURE — 3E0T3BZ INTRODUCTION OF ANESTHETIC AGENT INTO PERIPHERAL NERVES AND PLEXI, PERCUTANEOUS APPROACH: ICD-10-PCS

## 2018-03-12 PROCEDURE — 93971 EXTREMITY STUDY: CPT

## 2018-03-12 PROCEDURE — 85027 COMPLETE CBC AUTOMATED: CPT

## 2018-03-12 PROCEDURE — 94664 DEMO&/EVAL PT USE INHALER: CPT

## 2018-03-12 PROCEDURE — 82948 REAGENT STRIP/BLOOD GLUCOSE: CPT

## 2018-03-12 PROCEDURE — 73560 X-RAY EXAM OF KNEE 1 OR 2: CPT

## 2018-03-12 PROCEDURE — 94150 VITAL CAPACITY TEST: CPT

## 2018-03-12 PROCEDURE — 72110 X-RAY EXAM L-2 SPINE 4/>VWS: CPT

## 2018-03-12 PROCEDURE — 86850 RBC ANTIBODY SCREEN: CPT

## 2018-03-12 PROCEDURE — 94618 PULMONARY STRESS TESTING: CPT

## 2018-03-12 PROCEDURE — 73564 X-RAY EXAM KNEE 4 OR MORE: CPT

## 2018-03-12 RX ADMIN — LISINOPRIL SCH MG: 10 TABLET ORAL at 09:00

## 2018-03-12 RX ADMIN — FUROSEMIDE SCH MG: 80 TABLET ORAL at 09:00

## 2018-03-12 RX ADMIN — METOPROLOL SUCCINATE SCH MG: 25 TABLET, EXTENDED RELEASE ORAL at 09:00

## 2018-03-12 RX ADMIN — TIMOLOL MALEATE SCH DROP: 5 SOLUTION OPHTHALMIC at 22:43

## 2018-03-12 RX ADMIN — TIMOLOL MALEATE SCH DROP: 5 SOLUTION OPHTHALMIC at 09:00

## 2018-03-12 RX ADMIN — ACYCLOVIR SCH UNITS: 800 TABLET ORAL at 21:33

## 2018-03-12 RX ADMIN — PHENYTOIN SODIUM SCH MLS/HR: 50 INJECTION INTRAMUSCULAR; INTRAVENOUS at 20:00

## 2018-03-12 RX ADMIN — ROPIVACAINE HYDROCHLORIDE SCH MLS/HR: 5 INJECTION, SOLUTION EPIDURAL; INFILTRATION; PERINEURAL at 10:42

## 2018-03-12 RX ADMIN — INSULIN ASPART SCH: 100 INJECTION, SOLUTION INTRAVENOUS; SUBCUTANEOUS at 17:45

## 2018-03-12 RX ADMIN — GABAPENTIN SCH MG: 300 CAPSULE ORAL at 21:32

## 2018-03-12 RX ADMIN — BRIMONIDINE TARTRATE SCH DROP: 2 SOLUTION/ DROPS OPHTHALMIC at 22:43

## 2018-03-12 RX ADMIN — INSULIN ASPART SCH: 100 INJECTION, SOLUTION INTRAVENOUS; SUBCUTANEOUS at 21:32

## 2018-03-12 RX ADMIN — HYDROCODONE BITARTRATE AND ACETAMINOPHEN PRN TAB: 7.5; 325 TABLET ORAL at 21:37

## 2018-03-12 RX ADMIN — CEFAZOLIN SODIUM SCH MLS/HR: 2 SOLUTION INTRAVENOUS at 17:45

## 2018-03-12 RX ADMIN — ACYCLOVIR SCH UNITS: 800 TABLET ORAL at 07:15

## 2018-03-12 RX ADMIN — PHENYTOIN SODIUM SCH MLS/HR: 50 INJECTION INTRAMUSCULAR; INTRAVENOUS at 11:14

## 2018-03-12 RX ADMIN — BRIMONIDINE TARTRATE SCH DROP: 2 SOLUTION/ DROPS OPHTHALMIC at 09:00

## 2018-03-12 RX ADMIN — ROPIVACAINE HYDROCHLORIDE SCH MLS/HR: 5 INJECTION, SOLUTION EPIDURAL; INFILTRATION; PERINEURAL at 10:34

## 2018-03-12 RX ADMIN — HYDROCODONE BITARTRATE AND ACETAMINOPHEN PRN TAB: 7.5; 325 TABLET ORAL at 16:08

## 2018-03-12 RX ADMIN — CEFAZOLIN SODIUM SCH MLS/HR: 2 SOLUTION INTRAVENOUS at 12:45

## 2018-03-12 NOTE — RADRPT
EXAM DATE/TIME:  03/12/2018 11:04 

 

[facility] COMPARISON:     

CHEST SINGLE AP, August 26, 2017, 1:50.

 

                     

INDICATIONS :     

Post total right knee replacement.

                     

 

MEDICAL HISTORY :            

Hypertension. Diabetes mellitus type II.   

 

SURGICAL HISTORY :     

None.   

 

ENCOUNTER:     

Initial                                        

 

ACUITY:     

1 day      

 

PAIN SCORE:     

Non-responsive.

 

LOCATION:     

Right  knee.

 

FINDINGS:     

The patient is post right knee arthroplasty. Orthopedic hardware is in excellent position. Alignment 
is good. There is some gas within the subcutaneous soft tissues.

 

CONCLUSION:     

1. Orthopedic hardware in excellent position.

 

 

 

 Pedro Spence MD on March 12, 2018 at 11:38           

Board Certified Radiologist.

 This report was verified electronically.

## 2018-03-12 NOTE — MP
cc:

Tony Yousif MD

****

 

 

DATE OF OPERATION:

 

PREOPERATIVE DIAGNOSIS:

Right knee osteoarthritis.

 

POSTOPERATIVE DIAGNOSIS:

Right knee osteoarthritis.

 

PROCEDURE:

Right total knee arthroplasty.

 

SURGEON:

Dr. Tony Yousif.

 

FIRST ASSISTANT:

BRYSON Naranjo

 

ANESTHESIA:

General with a femoral nerve adductor canal block.

 

ESTIMATED BLOOD LOSS:

100 mL.

 

TOURNIQUET TIME:

29 minutes at 300 mmHg.

 

COMPLICATIONS:

None.

 

IMPLANTS USED:

DePuy Attune size 6 posterior stabilized femoral component, size 5 

rotating platform tibial baseplate, size 5 mm polyethylene tibial 

insert, size 35 patella.

 

JUSTIFICATION:

This patient is a 57-year-old female with history of severe endstage 

osteoarthritis involving the right knee.  She has severe disabling 

pain with standing, walking, ambulation, weight-bearing activities and

severe pain at rest.  She has failed greater than 3 months of 

nonoperative conservative treatment to include medication therapy, 

injections, ambulatory assisted aids, home exercise program, activity 

modification and weight loss attempts.  X-ray of the right knee 

reveals severe endstage osteoarthritis with bone on bone joint space 

narrowing, subchondral sclerosis, subchondral cyst, osteophyte 

formation, varus deformity and subluxation.  The patient was counseled

as to risks, benefits and alternatives to a total knee arthroplasty.  

The risks were discussed which include but not limited to anesthesia, 

bleeding, infection, damage to nerves, blood vessels, pain, stiffness,

failure of components, blood clots, pulmonary embolism and even death.

 The patient's pain is severe.  The patient favored the benefits over 

the risks and did wish to proceed with surgery.

 

PROCEDURE IN DETAIL:

Written consent was obtained.  The patient was identified by name and 

taken to the operating room and placed supine on the operating table. 

General anesthesia was administered as well as 2 grams of IV Ancef, 1 

gram of IV vancomycin.  She did receive an ultrasound guided adductor 

canal femoral nerve block by the anesthesiologist.  A well-padded 

tourniquet was placed on the right thigh.  The right lower extremity 

was prepped and draped using isopropyl alcohol and Hibiclens solution 

and ChloraPrep solution.  After a timeout was performed an Esmarch 

bandage was used to exsanguinate the right lower extremity and 

tourniquet was inflated to 300 mmHg.  A longitudinal incision was made

over the anterior aspect of the right knee.  A medial parapatellar 

arthrotomy was performed.  The patella was everted.  The patella 

resection guide was used to resect 9 mm of the patella.  The size 35 

mm guide was placed.  Two drill holes were placed and the 35 mm trial 

fit well.  Attention was turned to the femur.  Intramedullary guide 

was placed and distal femoral guide was set to remove 11 mm of distal 

femur 5 degrees off the anatomic valgus axis alignment.  An 

oscillating saw was used to perform the distal femoral cut.  Attention

was turned to the tibia where an extramedullary tibial guide was set 

to remove 5 mm of the lowest portion of the medial tibial plateau.  

The tibial guide was pinned in place and tibia cut was performed. A 5 

mm spacer block showed full extension.

 

Attention was turned back to the femur.  AP sizing block measured a 

size 6. Anterior reference 3 degree external rotation guide was used 

to pin a size 6 block in place.  Anterior, posterior and chamfer cuts 

were performed.  A size 6 PCL box cut was pinned in place and PCL was 

boxed out with an oscillating saw.  The medial and lateral meniscus 

remnants were removed as well as bone and soft tissue debris from the 

posterior portion of the knee.  A size 5 tibia baseplate was pinned in

place and the tibia was drilled with a punch.  Trial components were 

evaluated and final components were pinned in place.  With the current

components the leg could achieve full extension 0 degrees, flexion to 

140, no evidence of tibial liftoff. Varus/valgus balance appeared 

appropriate and symmetric and the patella was noted to track 

centrally. With the tourniquet deflated Bovie cautery was used for 

hemostasis.  The surgical wound was thoroughly irrigated with sterile 

saline pulse lavage antibiotic impregnated solution.  The arthrotomy 

incision was closed with #1 Vicryl suture, subcutaneous layer with 2-0

Vicryl suture, skin was closed with Dermabond.  Sterile dressings were

applied.  The patient tolerated the procedure well with no 

intraoperative complications noted.

 

Stephane Don, physician assistant certified was present during the 

entire procedure to include patient positioning, the procedure itself.

 The medical necessity of physician assistant was indicated in this 

case due to the complexity of the procedure.  He assisted with 

appropriate manipulation of the leg and also retraction of muscle, 

tendon, bone, neurovascular structure.  He assisted with preparation 

of bone and also implantation of the prosthetic replacement.          

 

 

 

__________________________________

MD CHASITY Mas/TL/maricruz

D: 03/12/2018, 10:10 AM

T: 03/12/2018, 10:54 AM

Visit #: [id number]

Job #: [id number]

## 2018-03-12 NOTE — PD.CONS
HPI


Service


[facility]


Consult Requested By


 Dr. Yousif


Reason for Consult


Medical management


Primary Care Physician


Myriam Knight M.D.


Diagnoses:  


(1) Chronic diastolic congestive heart failure


(2) Primary localized osteoarthrosis, lower leg


(3) DM (diabetes mellitus)


(4) Hypertension


History of Present Illness


The patient is a 57-year-old female admitted to the hospital for right total 

knee arthroplasty.  Hospitalist consultation was requested for medical 

management.  Patient reports a history of insulin-dependent diabetes mellitus, 

congestive heart failure, stage III kidney disease.  She reports pain in her 

right knee.  No other complaints at this time.





Review of Systems


Constitutional:  DENIES: Fever, Chills, Night Sweats


Eyes:  DENIES: Blurred vision, Vision loss


Ears, nose, mouth, throat:  DENIES: Hearing loss


Respiratory:  DENIES: Cough, Wheezing, Sputum production, Shortness of breath


Cardiovascular:  DENIES: Chest pain, Palpitations, Dyspnea on Exertion, Lower 

Extremity Edema


Gastrointestinal:  DENIES: Abdominal pain, Constipation, Diarrhea, Nausea, 

Vomiting


Genitourinary:  DENIES: Urinary frequency, Urinary incontinence, Urgency, 

Hematuria, Dysuria, Nocturia


Musculoskeletal:  COMPLAINS OF: Joint pain, DENIES: Muscle aches


Integumentary:  DENIES: Pruritus, Rash


Hematologic/lymphatic:  DENIES: Bruising


Neurologic:  DENIES: Headache





Past Family Social History


Allergies:  


Coded Allergies:  


     pregabalin (Verified  Allergy, Unknown, DIZZINESS, SLUGGISH, 3/12/18)


Past Medical History


Insulin-dependent diabetes mellitus


Chronic kidney disease stage III


Hypertension


Congestive heart failure, diastolic


Coronary artery disease


Depression


Hypothyroidism


Osteoarthritis


Past Surgical History


 section


Cholecystectomy


Hysterectomy


Reported Medications


Toprol-XL 25 mg daily


Amlodipine 10 mg daily


Lisinopril 10 mg daily


Meloxicam 15 mg daily


Naproxen 500 mg twice daily


Gabapentin 300 mg nightly


Fluoxetine 20 mg twice daily


Furosemide 80 mg daily


Combigan eyedrops every 12 hours in the right eye


Protonix 40 mg daily


Lantus 75 units before breakfast, 55 units nightly


Levothyroxine 25 mcg daily


Vitamin D3 1000 units daily


Family History


Diabetes mellitus


Renal failure


Heart disease


Cancer


Social History


Quit smoking more than 20 years ago.  Denies alcohol or illicit drug use.





Physical Exam


Vital Signs





Vital Signs








  Date Time  Temp Pulse Resp B/P (MAP) Pulse Ox O2 Delivery O2 Flow Rate FiO2


 


3/12/18 13:00  92 14 112/53 (72) 95 Nasal Cannula 2 


 


3/12/18 12:00  86 19 104/57 (73) 94 Nasal Cannula 3 


 


3/12/18 11:30  88 19 101/57 (72) 95 Nasal Cannula 4 


 


3/12/18 11:15  87 17 106/57 (73) 95 Nasal Cannula 4 


 


3/12/18 11:00  93 20 134/61 (85) 93 Nasal Cannula 4 


 


3/12/18 10:45  91 19 172/82 (112) 94 Simple Mask 8 


 


3/12/18 10:38 98.7 93 25 164/96 (118) 87 Simple Mask 8 


 


3/12/18 08:07  90 20 142/65 (90) 94   


 


3/12/18 07:22     99 Nasal Cannula 2 


 


3/12/18 07:22  85      


 


3/12/18 06:45 99.0 91 18 167/98 (121) 93   








Physical Exam


GENERAL: Obese female in no acute distress.  Sitting up in a chair.


HEENT: Normocephalic, atraumatic. Pupils equal, round and reactive. Extraocular 

movements intact. No scleral icterus. No injection or drainage. Oropharynx is 

clear. Mucous membranes are moist. 


CARDIOVASCULAR: Regular rate and rhythm without murmurs, gallops, or rubs. 


RESPIRATORY: Clear to auscultation. No wheezes, rales, or rhonchi. Breathing is 

non-labored. 


GASTROINTESTINAL: Abdomen soft, non-tender, nondistended.  


EXTREMITIES: 1+ bilateral lower extremity edema. No calf tenderness.  Left knee 

bandaged.  SCDs.


PSYCH: Alert and oriented x 3.


Imaging





Last Impressions








Knee X-Ray 3/12/18 0659 Signed





Impressions: 





 Service Date/Time:  2018 11:04 - CONCLUSION:  1. Orthopedic 





 hardware in excellent position.     Pedro Spence MD 











Assessment and Plan


Assessment and Plan


1.  Osteoarthritis: Status post right total knee arthroplasty.  Management per 

orthopedic surgery.  Continue pain control, bowel regimen, physical therapy.


2.  Chronic diastolic congestive heart failure: Not in acute exacerbation.  

Continue Lasix, lisinopril, metoprolol.


3.  Insulin-dependent diabetes mellitus: Continue home insulin regimen.  

Diabetic diet.  Monitor Accu-Cheks and cover with sliding scale insulin.


4.  Depression: Continue fluoxetine.


5.  Hypertension: Continue metoprolol, amlodipine, lisinopril.


6.  Hypothyroidism: Continue Synthroid.


7.  DVT prophylaxis: Lovenox.











Mau Norton MD Mar 12, 2018 15:28

## 2018-03-13 VITALS
DIASTOLIC BLOOD PRESSURE: 53 MMHG | SYSTOLIC BLOOD PRESSURE: 117 MMHG | TEMPERATURE: 96.8 F | HEART RATE: 77 BPM | RESPIRATION RATE: 17 BRPM | OXYGEN SATURATION: 96 %

## 2018-03-13 VITALS
OXYGEN SATURATION: 91 % | SYSTOLIC BLOOD PRESSURE: 137 MMHG | HEART RATE: 82 BPM | DIASTOLIC BLOOD PRESSURE: 65 MMHG | TEMPERATURE: 96.2 F | RESPIRATION RATE: 16 BRPM

## 2018-03-13 VITALS
OXYGEN SATURATION: 91 % | DIASTOLIC BLOOD PRESSURE: 46 MMHG | RESPIRATION RATE: 17 BRPM | HEART RATE: 75 BPM | SYSTOLIC BLOOD PRESSURE: 94 MMHG | TEMPERATURE: 97.1 F

## 2018-03-13 VITALS
SYSTOLIC BLOOD PRESSURE: 102 MMHG | TEMPERATURE: 95.8 F | HEART RATE: 75 BPM | OXYGEN SATURATION: 98 % | DIASTOLIC BLOOD PRESSURE: 64 MMHG | RESPIRATION RATE: 17 BRPM

## 2018-03-13 VITALS — HEART RATE: 77 BPM

## 2018-03-13 VITALS — HEART RATE: 78 BPM | SYSTOLIC BLOOD PRESSURE: 111 MMHG | DIASTOLIC BLOOD PRESSURE: 59 MMHG

## 2018-03-13 VITALS — HEART RATE: 78 BPM

## 2018-03-13 VITALS
HEART RATE: 73 BPM | OXYGEN SATURATION: 98 % | SYSTOLIC BLOOD PRESSURE: 122 MMHG | RESPIRATION RATE: 17 BRPM | DIASTOLIC BLOOD PRESSURE: 73 MMHG | TEMPERATURE: 96.3 F

## 2018-03-13 VITALS — OXYGEN SATURATION: 97 %

## 2018-03-13 LAB
BUN SERPL-MCNC: 23 MG/DL (ref 7–18)
CALCIUM SERPL-MCNC: 7.9 MG/DL (ref 8.5–10.1)
CHLORIDE SERPL-SCNC: 101 MEQ/L (ref 98–107)
CREAT SERPL-MCNC: 1.71 MG/DL (ref 0.5–1)
ERYTHROCYTE [DISTWIDTH] IN BLOOD BY AUTOMATED COUNT: 14.2 % (ref 11.6–17.2)
GFR SERPLBLD BASED ON 1.73 SQ M-ARVRAT: 37 ML/MIN (ref 89–?)
GLUCOSE SERPL-MCNC: 219 MG/DL (ref 74–106)
HCO3 BLD-SCNC: 30.9 MEQ/L (ref 21–32)
HCT VFR BLD CALC: 32.7 % (ref 35–46)
HGB BLD-MCNC: 10.8 GM/DL (ref 11.6–15.3)
MCH RBC QN AUTO: 30 PG (ref 27–34)
MCHC RBC AUTO-ENTMCNC: 33 % (ref 32–36)
MCV RBC AUTO: 90.9 FL (ref 80–100)
PLATELET # BLD: 235 TH/MM3 (ref 150–450)
PMV BLD AUTO: 8.7 FL (ref 7–11)
RBC # BLD AUTO: 3.6 MIL/MM3 (ref 4–5.3)
SODIUM SERPL-SCNC: 139 MEQ/L (ref 136–145)
WBC # BLD AUTO: 9.7 TH/MM3 (ref 4–11)

## 2018-03-13 RX ADMIN — HYDROCODONE BITARTRATE AND ACETAMINOPHEN PRN TAB: 7.5; 325 TABLET ORAL at 05:20

## 2018-03-13 RX ADMIN — BRIMONIDINE TARTRATE SCH DROP: 2 SOLUTION/ DROPS OPHTHALMIC at 08:30

## 2018-03-13 RX ADMIN — FUROSEMIDE SCH MG: 80 TABLET ORAL at 08:29

## 2018-03-13 RX ADMIN — INSULIN ASPART SCH: 100 INJECTION, SOLUTION INTRAVENOUS; SUBCUTANEOUS at 17:37

## 2018-03-13 RX ADMIN — MORPHINE SULFATE PRN MG: 2 INJECTION, SOLUTION INTRAMUSCULAR; INTRAVENOUS at 11:49

## 2018-03-13 RX ADMIN — BRIMONIDINE TARTRATE SCH DROP: 2 SOLUTION/ DROPS OPHTHALMIC at 20:23

## 2018-03-13 RX ADMIN — PHENYTOIN SODIUM SCH MLS/HR: 50 INJECTION INTRAMUSCULAR; INTRAVENOUS at 16:00

## 2018-03-13 RX ADMIN — DOCUSATE SODIUM SCH MG: 100 CAPSULE, LIQUID FILLED ORAL at 20:21

## 2018-03-13 RX ADMIN — INSULIN ASPART SCH: 100 INJECTION, SOLUTION INTRAVENOUS; SUBCUTANEOUS at 08:00

## 2018-03-13 RX ADMIN — INSULIN ASPART SCH: 100 INJECTION, SOLUTION INTRAVENOUS; SUBCUTANEOUS at 20:22

## 2018-03-13 RX ADMIN — PHENYTOIN SODIUM SCH MLS/HR: 50 INJECTION INTRAMUSCULAR; INTRAVENOUS at 05:18

## 2018-03-13 RX ADMIN — HYDROCODONE BITARTRATE AND ACETAMINOPHEN PRN TAB: 7.5; 325 TABLET ORAL at 10:35

## 2018-03-13 RX ADMIN — METOPROLOL SUCCINATE SCH MG: 25 TABLET, EXTENDED RELEASE ORAL at 08:29

## 2018-03-13 RX ADMIN — LISINOPRIL SCH MG: 10 TABLET ORAL at 08:29

## 2018-03-13 RX ADMIN — GABAPENTIN SCH MG: 300 CAPSULE ORAL at 20:22

## 2018-03-13 RX ADMIN — LEVOTHYROXINE SODIUM SCH MCG: 25 TABLET ORAL at 05:17

## 2018-03-13 RX ADMIN — ACYCLOVIR SCH UNITS: 800 TABLET ORAL at 06:45

## 2018-03-13 RX ADMIN — PANTOPRAZOLE SCH MG: 40 TABLET, DELAYED RELEASE ORAL at 08:28

## 2018-03-13 RX ADMIN — INSULIN ASPART SCH: 100 INJECTION, SOLUTION INTRAVENOUS; SUBCUTANEOUS at 12:05

## 2018-03-13 RX ADMIN — MORPHINE SULFATE PRN MG: 2 INJECTION, SOLUTION INTRAMUSCULAR; INTRAVENOUS at 06:50

## 2018-03-13 RX ADMIN — MULTIPLE VITAMINS W/ MINERALS TAB SCH TAB: TAB at 20:21

## 2018-03-13 RX ADMIN — PHENYTOIN SODIUM SCH MLS/HR: 50 INJECTION INTRAMUSCULAR; INTRAVENOUS at 20:07

## 2018-03-13 RX ADMIN — ACYCLOVIR SCH UNITS: 800 TABLET ORAL at 20:22

## 2018-03-13 RX ADMIN — HYDROCODONE BITARTRATE AND ACETAMINOPHEN PRN TAB: 7.5; 325 TABLET ORAL at 17:29

## 2018-03-13 RX ADMIN — VITAMIN D, TAB 1000IU (100/BT) SCH UNITS: 25 TAB at 08:37

## 2018-03-13 RX ADMIN — MORPHINE SULFATE PRN MG: 2 INJECTION, SOLUTION INTRAMUSCULAR; INTRAVENOUS at 20:24

## 2018-03-13 RX ADMIN — HYDROCODONE BITARTRATE AND ACETAMINOPHEN PRN TAB: 7.5; 325 TABLET ORAL at 23:54

## 2018-03-13 RX ADMIN — CEFAZOLIN SODIUM SCH MLS/HR: 2 SOLUTION INTRAVENOUS at 00:59

## 2018-03-13 RX ADMIN — TIMOLOL MALEATE SCH DROP: 5 SOLUTION OPHTHALMIC at 20:23

## 2018-03-13 RX ADMIN — TIMOLOL MALEATE SCH DROP: 5 SOLUTION OPHTHALMIC at 08:30

## 2018-03-13 NOTE — HHI.FF
Face to Face Verification


Diagnosis:  


(1) Primary localized osteoarthrosis, lower leg


Physical Therapy


Gait training, Safety evaluation, Transfer training, bed to chair


Knee:  Total knee, Protocol: Right, Full weight bearing


Right LE Weight Bearing:  WB as tolerated





Nursing


RN:  3 days/week x 2 weeks


Nursing:  Dressing changes


Dressing Changes:  Daily dressing change











I have seen patient Corrie Winters on 3/13/18. My clinical findings support 

the need for the requested home health care services because:








 Limited ability to care for self





 High risk of falls














I certify that my clinical findings support that this patient is homebound 

because:








 Post-op weakness





 Unsteady gait/balance

















Tony Don Mar 13, 2018 08:12

## 2018-03-13 NOTE — PD.ORT.PN
Subjective


Post Op Day #:  1


Subjective Remarks


knee painful





Objective


Vitals





Vital Signs








  Date Time  Temp Pulse Resp B/P (MAP) Pulse Ox O2 Delivery O2 Flow Rate FiO2


 


3/13/18 07:53 97.1 75 17 94/46 (62) 91   


 


3/13/18 03:27 96.8 77 17 117/53 (74) 96   


 


3/12/18 23:55 97.5 89 18 130/65 (86) 96   


 


3/12/18 21:38     96 Nasal Cannula 2.00 


 


3/12/18 20:49 96.9 98 17 138/60 (86) 92   


 


3/12/18 16:00 97.4 94 18 114/59 (77) 96   


 


3/12/18 13:00  92 14 112/53 (72) 95 Nasal Cannula 2 


 


3/12/18 12:00  86 19 104/57 (73) 94 Nasal Cannula 3 


 


3/12/18 11:30  88 19 101/57 (72) 95 Nasal Cannula 4 


 


3/12/18 11:15  87 17 106/57 (73) 95 Nasal Cannula 4 


 


3/12/18 11:00  93 20 134/61 (85) 93 Nasal Cannula 4 


 


3/12/18 10:45  91 19 172/82 (112) 94 Simple Mask 8 


 


3/12/18 10:38 98.7 93 25 164/96 (118) 87 Simple Mask 8 














I/O      


 


 3/12/18 3/12/18 3/12/18 3/13/18 3/13/18 3/13/18





 07:00 15:00 23:00 07:00 15:00 23:00


 


Intake Total  1350 ml 360 ml 1791 ml  


 


Output Total  325 ml 400 ml 300 ml  


 


Balance  1025 ml -40 ml 1491 ml  


 


      


 


Intake Oral   360 ml 360 ml  


 


IV Total  1350 ml  1431 ml  


 


Output Urine Total  125 ml 400 ml 300 ml  


 


Estimated Blood Loss  200 ml    


 


# Bowel Movements   0 0  








Result Diagram:  


3/13/18 0535                                                                   

             3/13/18 0535





Objective Remarks


in bed, nad


dressing scant bloody drainage


neg homans


nvi





Assessment & Plan


Ortho Post Op Day #:  1


Problem List:  


Assessment and Plan


s/p R TKA


wbat


ok to maintain dressing unless saturated


lovenox, d/c on asa81


d/c planning home with [facility] and pt


rx in chart


f/up dr. tse 2 weeks











Tony Don Mar 13, 2018 08:09

## 2018-03-13 NOTE — HHI.PR
Subjective


Remarks


Follow-up diabetes, hypertension.  Patient had some shortness of breath this 

morning and oxygen saturation was documented at 91%.  She was placed on oxygen 

per nasal cannula and her O2 sat improved to 97%.  She denies shortness of 

breath or chest pain at this time.  Pain is adequately controlled.





Objective


Vitals





Vital Signs








  Date Time  Temp Pulse Resp B/P (MAP) Pulse Ox O2 Delivery O2 Flow Rate FiO2


 


3/13/18 12:00 96.3 73 17 122/73 (89) 98   


 


3/13/18 10:26  78  111/59 (76)    


 


3/13/18 09:10     97 Nasal Cannula 2.00 


 


3/13/18 07:53 97.1 75 17 94/46 (62) 91   


 


3/13/18 03:27 96.8 77 17 117/53 (74) 96   


 


3/12/18 23:55 97.5 89 18 130/65 (86) 96   


 


3/12/18 21:38     96 Nasal Cannula 2.00 


 


3/12/18 20:49 96.9 98 17 138/60 (86) 92   


 


3/12/18 16:00 97.4 94 18 114/59 (77) 96   














I/O      


 


 3/12/18 3/12/18 3/12/18 3/13/18 3/13/18 3/13/18





 07:00 15:00 23:00 07:00 15:00 23:00


 


Intake Total  1350 ml 360 ml 1791 ml 528 ml 


 


Output Total  325 ml 400 ml 300 ml  


 


Balance  1025 ml -40 ml 1491 ml 528 ml 


 


      


 


Intake Oral   360 ml 360 ml  


 


IV Total  1350 ml  1431 ml 528 ml 


 


Output Urine Total  125 ml 400 ml 300 ml  


 


Estimated Blood Loss  200 ml    


 


# Bowel Movements   0 0  








Result Diagram:  


3/13/18 0535                                                                   

             3/13/18 0535





Imaging





Last Impressions








Knee X-Ray 3/12/18 0659 Signed





Impressions: 





 Service Date/Time:  Monday, March 12, 2018 11:04 - CONCLUSION:  1. Orthopedic 





 hardware in excellent position.     Pedro Spence MD 








Objective Remarks


General: No acute distress.  In a wheelchair.


Heart: Regular rate and rhythm. No murmur.


Lungs: Clear to auscultation bilaterally. No wheezes, rales, or rhonchi. 

Breathing is nonlabored.


Abdomen: Soft, nontender, nondistended.


Extremities: Trace to 1+ bilateral lower extremity edema.  Right knee bandaged.


Psych: Alert and oriented.


Procedures


3/12/18 right total knee arthroplasty


Urinary Catheter:  No


Vascular Central Line Catheter:  No





A/P


Problem List:  


(1) Chronic diastolic congestive heart failure


ICD Code:  I50.32 - Chronic diastolic (congestive) heart failure


(2) Primary localized osteoarthrosis, lower leg


ICD Code:  M17.10 - Unilateral primary osteoarthritis, unspecified knee


(3) DM (diabetes mellitus)


ICD Code:  E11.9 - Type 2 diabetes mellitus without complications


Status:  Acute


(4) Hypertension


ICD Code:  I10 - Essential (primary) hypertension


Status:  Acute


Assessment and Plan


1.  Osteoarthritis: Status post right total knee arthroplasty.  Management per 

orthopedic surgery.  Continue pain control, bowel regimen, physical therapy.


2.  Chronic diastolic congestive heart failure: Not in acute exacerbation.  

Continue Lasix, lisinopril, metoprolol.


3.  Insulin-dependent diabetes mellitus: Glucose has been elevated.  Continue 

home insulin regimen.  Diabetic diet.  Monitor Accu-Cheks and cover with 

sliding scale insulin.


4.  Depression: Continue fluoxetine.


5.  Hypertension: Continue metoprolol, amlodipine, lisinopril.  Blood pressure 

borderline low this morning.  Improved this afternoon.


6.  Hypothyroidism: Continue Synthroid.


7.  DVT prophylaxis: Lovenox.


8.  Transient shortness of breath: Patient's oxygen saturation decreased 

slightly this morning.  She was placed on oxygen.  She is now on room air.  No 

symptoms at this time.  If patient continues to require oxygen develops 

worsening dyspnea, would check chest x-ray.


9.  Chronic kidney disease stage III: Monitor BUN and creatinine.


Discharge Planning


Possible discharge home tomorrow per orthopedic surgery.











Mau Norton MD Mar 13, 2018 13:07

## 2018-03-13 NOTE — HHI.DCPOC
Discharge Care Plan


Diagnosis:  


(1) Primary localized osteoarthrosis, lower leg








Your Health Problems Are: Difficulty with ADL








Goals to Promote Your Health


* To prevent worsening of your condition and complications


* To maintain your health at the optimal level


Directions to Meet Your Goals


*** Take your medications as prescribed


*** Follow your dietary instruction


*** Follow activity as directed








*** Keep your appointments as scheduled


*** Take your immunizations and boosters as scheduled


*** If your symptoms worsen call your PCP, if no PCP go to Urgent Care Center 

or Emergency Room***


*** Smoking is Dangerous to Your Health. Avoid second hand smoke***


***Call the 24-hour hour crisis hotline for domestic abuse at 1-765.192.1918***











Tony Don Mar 13, 2018 08:11

## 2018-03-14 VITALS
OXYGEN SATURATION: 93 % | SYSTOLIC BLOOD PRESSURE: 125 MMHG | RESPIRATION RATE: 17 BRPM | TEMPERATURE: 97.8 F | DIASTOLIC BLOOD PRESSURE: 73 MMHG | HEART RATE: 81 BPM

## 2018-03-14 VITALS
DIASTOLIC BLOOD PRESSURE: 54 MMHG | SYSTOLIC BLOOD PRESSURE: 116 MMHG | TEMPERATURE: 98.3 F | OXYGEN SATURATION: 95 % | HEART RATE: 96 BPM | RESPIRATION RATE: 17 BRPM

## 2018-03-14 VITALS
DIASTOLIC BLOOD PRESSURE: 69 MMHG | SYSTOLIC BLOOD PRESSURE: 139 MMHG | TEMPERATURE: 96.7 F | RESPIRATION RATE: 19 BRPM | HEART RATE: 84 BPM | OXYGEN SATURATION: 92 %

## 2018-03-14 VITALS
SYSTOLIC BLOOD PRESSURE: 149 MMHG | HEART RATE: 85 BPM | OXYGEN SATURATION: 92 % | DIASTOLIC BLOOD PRESSURE: 79 MMHG | RESPIRATION RATE: 19 BRPM | TEMPERATURE: 97.8 F

## 2018-03-14 VITALS — HEART RATE: 85 BPM

## 2018-03-14 VITALS — HEART RATE: 76 BPM

## 2018-03-14 VITALS
TEMPERATURE: 98.7 F | DIASTOLIC BLOOD PRESSURE: 64 MMHG | HEART RATE: 111 BPM | OXYGEN SATURATION: 97 % | SYSTOLIC BLOOD PRESSURE: 130 MMHG | RESPIRATION RATE: 17 BRPM

## 2018-03-14 VITALS — HEART RATE: 97 BPM

## 2018-03-14 VITALS — OXYGEN SATURATION: 93 %

## 2018-03-14 VITALS — OXYGEN SATURATION: 98 %

## 2018-03-14 VITALS
RESPIRATION RATE: 16 BRPM | TEMPERATURE: 96.9 F | OXYGEN SATURATION: 94 % | HEART RATE: 82 BPM | DIASTOLIC BLOOD PRESSURE: 64 MMHG | SYSTOLIC BLOOD PRESSURE: 135 MMHG

## 2018-03-14 VITALS — HEART RATE: 84 BPM

## 2018-03-14 LAB
BUN SERPL-MCNC: 23 MG/DL (ref 7–18)
CALCIUM SERPL-MCNC: 8.3 MG/DL (ref 8.5–10.1)
CHLORIDE SERPL-SCNC: 100 MEQ/L (ref 98–107)
CREAT SERPL-MCNC: 1.38 MG/DL (ref 0.5–1)
ERYTHROCYTE [DISTWIDTH] IN BLOOD BY AUTOMATED COUNT: 14.3 % (ref 11.6–17.2)
GFR SERPLBLD BASED ON 1.73 SQ M-ARVRAT: 47 ML/MIN (ref 89–?)
GLUCOSE SERPL-MCNC: 242 MG/DL (ref 74–106)
HCO3 BLD-SCNC: 30.3 MEQ/L (ref 21–32)
HCT VFR BLD CALC: 33.3 % (ref 35–46)
HGB BLD-MCNC: 10.9 GM/DL (ref 11.6–15.3)
MCH RBC QN AUTO: 29.8 PG (ref 27–34)
MCHC RBC AUTO-ENTMCNC: 32.8 % (ref 32–36)
MCV RBC AUTO: 90.9 FL (ref 80–100)
PLATELET # BLD: 239 TH/MM3 (ref 150–450)
PMV BLD AUTO: 8.9 FL (ref 7–11)
RBC # BLD AUTO: 3.66 MIL/MM3 (ref 4–5.3)
SODIUM SERPL-SCNC: 137 MEQ/L (ref 136–145)
WBC # BLD AUTO: 8.4 TH/MM3 (ref 4–11)

## 2018-03-14 RX ADMIN — STANDARDIZED SENNA CONCENTRATE AND DOCUSATE SODIUM SCH TAB: 8.6; 5 TABLET, FILM COATED ORAL at 21:04

## 2018-03-14 RX ADMIN — LISINOPRIL SCH MG: 10 TABLET ORAL at 08:19

## 2018-03-14 RX ADMIN — MAGNESIUM HYDROXIDE PRN ML: 400 SUSPENSION ORAL at 05:49

## 2018-03-14 RX ADMIN — METOPROLOL SUCCINATE SCH MG: 25 TABLET, EXTENDED RELEASE ORAL at 08:18

## 2018-03-14 RX ADMIN — INSULIN ASPART SCH: 100 INJECTION, SOLUTION INTRAVENOUS; SUBCUTANEOUS at 09:14

## 2018-03-14 RX ADMIN — MORPHINE SULFATE PRN MG: 2 INJECTION, SOLUTION INTRAMUSCULAR; INTRAVENOUS at 07:38

## 2018-03-14 RX ADMIN — HYDROCODONE BITARTRATE AND ACETAMINOPHEN PRN TAB: 7.5; 325 TABLET ORAL at 05:48

## 2018-03-14 RX ADMIN — DOCUSATE SODIUM SCH MG: 100 CAPSULE, LIQUID FILLED ORAL at 08:20

## 2018-03-14 RX ADMIN — HYDROCODONE BITARTRATE AND ACETAMINOPHEN PRN TAB: 7.5; 325 TABLET ORAL at 14:25

## 2018-03-14 RX ADMIN — TIMOLOL MALEATE SCH DROP: 5 SOLUTION OPHTHALMIC at 08:28

## 2018-03-14 RX ADMIN — MULTIPLE VITAMINS W/ MINERALS TAB SCH TAB: TAB at 08:18

## 2018-03-14 RX ADMIN — MAGNESIUM HYDROXIDE PRN ML: 400 SUSPENSION ORAL at 21:04

## 2018-03-14 RX ADMIN — HYDROCODONE BITARTRATE AND ACETAMINOPHEN PRN TAB: 7.5; 325 TABLET ORAL at 21:05

## 2018-03-14 RX ADMIN — GABAPENTIN SCH MG: 300 CAPSULE ORAL at 21:05

## 2018-03-14 RX ADMIN — TIMOLOL MALEATE SCH DROP: 5 SOLUTION OPHTHALMIC at 21:06

## 2018-03-14 RX ADMIN — FUROSEMIDE SCH MG: 80 TABLET ORAL at 08:19

## 2018-03-14 RX ADMIN — VITAMIN D, TAB 1000IU (100/BT) SCH UNITS: 25 TAB at 08:27

## 2018-03-14 RX ADMIN — ACYCLOVIR SCH UNITS: 800 TABLET ORAL at 08:00

## 2018-03-14 RX ADMIN — INSULIN ASPART SCH: 100 INJECTION, SOLUTION INTRAVENOUS; SUBCUTANEOUS at 13:18

## 2018-03-14 RX ADMIN — BRIMONIDINE TARTRATE SCH DROP: 2 SOLUTION/ DROPS OPHTHALMIC at 08:28

## 2018-03-14 RX ADMIN — MORPHINE SULFATE PRN MG: 2 INJECTION, SOLUTION INTRAMUSCULAR; INTRAVENOUS at 00:49

## 2018-03-14 RX ADMIN — LEVOTHYROXINE SODIUM SCH MCG: 25 TABLET ORAL at 05:48

## 2018-03-14 RX ADMIN — ASPIRIN 81 MG SCH MG: 81 TABLET ORAL at 13:19

## 2018-03-14 RX ADMIN — INSULIN ASPART SCH: 100 INJECTION, SOLUTION INTRAVENOUS; SUBCUTANEOUS at 18:12

## 2018-03-14 RX ADMIN — BRIMONIDINE TARTRATE SCH DROP: 2 SOLUTION/ DROPS OPHTHALMIC at 21:06

## 2018-03-14 RX ADMIN — MULTIPLE VITAMINS W/ MINERALS TAB SCH TAB: TAB at 21:05

## 2018-03-14 RX ADMIN — INSULIN ASPART SCH: 100 INJECTION, SOLUTION INTRAVENOUS; SUBCUTANEOUS at 21:00

## 2018-03-14 RX ADMIN — PANTOPRAZOLE SCH MG: 40 TABLET, DELAYED RELEASE ORAL at 08:20

## 2018-03-14 RX ADMIN — ACYCLOVIR SCH UNITS: 800 TABLET ORAL at 21:00

## 2018-03-14 NOTE — HHI.PR
Subjective


Remarks


Follow up for diabetes, hypertension. The patient reports increased right knee 

pain, mostly across the anterior knee but also into the right calf. Denies 

fevers/chills. She has some throat irritation and nonproductive cough since 

surgery yesterday. Denies any chest pain or shortness of breath. She is 

tolerating oral intake. Her last BM was [place] 3/11.  She does not feel ready 

for discharge today secondary to the pain.





Objective


Vitals





Vital Signs








  Date Time  Temp Pulse Resp B/P (MAP) Pulse Ox O2 Delivery O2 Flow Rate FiO2


 


3/14/18 08:00 98.7 111 17 130/64 (86) 97   


 


3/14/18 04:02  97      


 


3/14/18 04:00 96.9 82 16 135/64 (87) 94   


 


3/14/18 00:07  76      


 


3/14/18 00:00 96.7 84 19 139/69 (92) 92   


 


3/13/18 20:11  77      


 


3/13/18 20:00 96.2 82 16 137/65 (89) 91   


 


3/13/18 17:42  78      


 


3/13/18 16:00 95.8 75 17 102/64 (77) 98   


 


3/13/18 12:00 96.3 73 17 122/73 (89) 98   


 


3/13/18 10:26  78  111/59 (76)    


 


3/13/18 09:10     97 Nasal Cannula 2.00 














I/O      


 


 3/13/18 3/13/18 3/13/18 3/14/18 3/14/18 3/14/18





 07:00 15:00 23:00 07:00 15:00 23:00


 


Intake Total 1791 ml 948 ml 240 ml 0 ml  


 


Output Total 300 ml     


 


Balance 1491 ml 948 ml 240 ml 0 ml  


 


      


 


Intake Oral 360 ml 420 ml 240 ml 0 ml  


 


IV Total 1431 ml 528 ml    


 


Output Urine Total 300 ml     


 


# Voids  1 2 2  


 


# Bowel Movements 0 0 0 0  








Result Diagram:  


3/13/18 0535                                                                   

             3/13/18 0535





Imaging





Last Impressions








Knee X-Ray 3/12/18 0659 Signed





Impressions: 





 Service Date/Time:  Monday, March 12, 2018 11:04 - CONCLUSION:  1. Orthopedic 





 hardware in excellent position.     Pedro Spence MD 








Objective Remarks


GENERAL: Well-nourished, well-developed obese female patient in NAD.


SKIN: Warm and dry. 


HEENT:  Normocephalic. Atraumatic. Pupils equal and round. Mucous membranes 

pink and moist.


NECK: Supple. Trachea midline.  


CARDIOVASCULAR: Regular rate and rhythm. No murmur appreciated. 


RESPIRATORY: No accessory muscle use. Clear to auscultation. Breath sounds 

equal bilaterally.  


GASTROINTESTINAL: Abdomen soft, non-tender, nondistended. Normoactive bowel 

sounds x4.


MUSCULOSKELETAL: No obvious deformities. Right knee with surgical dressing, 

CDI. Diffuse tenderness to palpation throughout the knee and posterior calf on 

the right. Nontender on the left.   


NEUROLOGICAL: Awake and alert. No obvious cranial nerve deficits.  Motor 

grossly within normal limits.  Normal speech.


PSYCHIATRIC: Appropriate mood and affect; insight and judgment normal.


Procedures


3/12/18 right total knee arthroplasty


Medications and IVs





Current Medications








 Medications


  (Trade)  Dose


 Ordered  Sig/Nazia


 Route  Start Time


 Stop Time Status Last Admin


 


  (Betadine 5%


 Antisepsis Kit)  1 applic  ON CALL  PRN


 EACH NARE  3/12/18 06:30


 3/15/18 06:29  3/12/18 07:00


 


 


  (Norvasc)  10 mg  DAILY


 PO  3/12/18 09:00


     


 


 


  (Vitamin D3)  1,000 units  DAILY


 PO  3/13/18 09:00


    3/14/18 08:27


 


 


  (PROzac)  20 mg  BID


 PO  3/12/18 09:00


    3/14/18 08:18


 


 


  (Lasix)  80 mg  DAILY


 PO  3/12/18 09:00


    3/14/18 08:19


 


 


  (Neurontin)  300 mg  HS


 PO  3/12/18 21:00


    3/13/18 20:22


 


 


  (Levemir Inj)  55 units  HS


 SQ  3/12/18 21:00


    3/13/18 20:22


 


 


  (Synthroid)  25 mcg  DAILY@0600


 PO  3/13/18 06:00


    3/14/18 05:48


 


 


  (Prinivil)  10 mg  DAILY


 PO  3/12/18 09:00


     


 


 


  (Toprol Xl)  25 mg  DAILY


 PO  3/12/18 09:00


    3/14/18 08:18


 


 


  (Protonix)  40 mg  DAILY


 PO  3/13/18 09:00


    3/14/18 08:20


 


 


  (Lovenox Inj)  40 mg  Q24H


 SQ  3/13/18 10:00


    3/13/18 10:22


 


 


  (Morphine Inj)  3 mg  Q3H  PRN


 IV PUSH  3/12/18 07:00


    3/14/18 07:38


 


 


  (Norco  7.5-325


 Mg)  1 tab  Q4H  PRN


 PO  3/12/18 07:00


     


 


 


  (Norco  7.5-325


 Mg)  2 tab  Q4H  PRN


 PO  3/12/18 07:00


    3/14/18 05:48


 


 


  (Theragran M Tab)  1 tab  BID


 PO  3/13/18 21:00


 5/12/18 20:59  3/14/18 08:18


 


 


  (Zofran Inj)  4 mg  Q6H  PRN


 IVP  3/12/18 07:00


     


 


 


  (Colace)  100 mg  BID


 PO  3/13/18 21:00


    3/14/18 08:20


 


 


  (Ambien)  5 mg  HS  PRN


 PO  3/12/18 07:00


     


 


 


  (Dulcolax Supp)  10 mg  DAILY  PRN


 RECTAL  3/12/18 07:00


     


 


 


  (Benadryl Inj)  25 mg  Q6H  PRN


 IV PUSH  3/12/18 07:00


     


 


 


  (Alphagan 0.2%


 Opth Soln)  1 drop  Q12HR


 RIGHT EYE  3/12/18 09:00


    3/14/18 08:28


 


 


  (Timoptic 0.5%


 Opth Soln)  1 drop  Q12HR


 RIGHT EYE  3/12/18 09:00


    3/14/18 08:28


 


 


  (D50w (Vial) Inj)  50 ml  UNSCH  PRN


 IV PUSH  3/12/18 15:30


     


 


 


  (Glucagon Inj)  1 mg  UNSCH  PRN


 OTHER  3/12/18 15:30


     


 


 


  (NovoLOG


 SUPPLEMENTAL


 SCALE)  1  ACHS SLIDING  SCALE


 SQ  3/12/18 17:00


    3/13/18 20:22


 


 


  (Levemir Inj)  75 units  DAILY@0800


 SQ  3/14/18 08:00


     


 


 


  (Milk Of


 Magnesia Liq)  30 ml  BID  PRN


 PO  3/13/18 23:30


    3/14/18 05:49


 











A/P


Problem List:  


(1) Chronic diastolic congestive heart failure


ICD Code:  I50.32 - Chronic diastolic (congestive) heart failure


(2) Primary localized osteoarthrosis, lower leg


ICD Code:  M17.10 - Unilateral primary osteoarthritis, unspecified knee


(3) DM (diabetes mellitus)


ICD Code:  E11.9 - Type 2 diabetes mellitus without complications


Status:  Acute


(4) Hypertension


ICD Code:  I10 - Essential (primary) hypertension


Status:  Acute


Assessment and Plan


57-year-old female with history of insulin dependent diabetes, CKD stage III, 

HTN, diastolic CHF, CAD, hypothyroidism, depression, osteoarthritis, admitted 

to the hospital for right total knee arthroplasty.  Hospitalist consultation 

was requested for medical management.  





Osteoarthritis: Status post right total knee arthroplasty.  


   -Management per orthopedic surgery.  


   -Continue pain control, bowel regimen, physical therapy.


   -Patient with increased right knee/calf pain today, checking Doppler U/S





Chronic diastolic congestive heart failure: Not in acute exacerbation.  


   -Continue Lasix, lisinopril, metoprolol.


   -Monitor for fluid overload





Insulin-dependent diabetes mellitus: Glucose has been elevated.  


   -Continue home insulin regimen.  


   -Diabetic diet.  


   -Monitor Accu-Cheks and cover with sliding scale insulin.





Hypertension: Chronic, BP well controlled. 


   -Continue patient's metoprolol, amlodipine, lisinopril.  


   -Monitor BP, adjust antihypertensives as needed





Transient shortness of breath: Patient's oxygen saturation decreased slightly 

this morning.  She was placed on oxygen.  She is now on room air.  No symptoms 

at this time. 


   -no significant complaints of chest pain or shortness of breath


   -attempt to wean oxygen today





Chronic kidney disease stage III: chronic, stable


   -avoid nephrotoxins


   -Monitor BUN and creatinine.





Hypothyroidism: chronic


   -Continue Synthroid.





Depression: Chronic


   -Continue fluoxetine.





Constipation: suspect secondary to opiates. Last BM 3/11. 


   -Start michaelle-colace 2 tabs po bid


   -Constipation protocol meds prn


   -Given MOM this morning


   -Monitor for BM





DVT prophylaxis: Lovenox.


Discharge Planning


Cleared for discharge with [facility] today by ortho however then patient developed 

increased right knee/calf pain. Awaiting Doppler U/S. Otherwise patient is 

medically stable.





Problem Qualifiers





(1) Primary localized osteoarthrosis, lower leg:  


Qualified Codes:  M17.11 - Unilateral primary osteoarthritis, right knee








Kirsten Riddle PA-C Mar 14, 2018 9:04 am

## 2018-03-14 NOTE — RADRPT
EXAM DATE/TIME:  2018 09:50 

 

[facility] COMPARISON:     

No previous studies available for comparison.

        

 

 

INDICATIONS :                

Post right total knee replacement.  Right leg pain and swelling.

            

 

MEDICAL HISTORY :           

Thyroid disease.  MI.  CVA.  CHF.  Pancreatitis.

 

SURGICAL HISTORY :     

 section. Hysterectomy.  Cholecystectomy. 

 

ENCOUNTER:     

Subsequent

 

ACUITY:     

1 day

 

PAIN SCORE:      

10/10

 

LOCATION:      

Right  leg.

            

                      

 

TECHNIQUE:     

Venous ultrasound of the leg was performed from the inguinal ligament to the proximal calf.  Real-alda
e, color Doppler and spectral tracing, compression and augmentation techniques were used.  

 

FINDINGS:     

There is normal compressibility of the deep venous system from the inguinal region to the proximal ca
lf.  No echogenic clot is seen in the lumen of the common femoral, femoral, popliteal, and posterior 
tibial veins.  There is a normal response of the venous system to proximal and distal augmentation an
d respiration.  

 

CONCLUSION:     No evidence of deep venous thrombosis within the right lower extremity. 

 

 

 Daquan Chisholm MD on 2018 at 11:07           

Board Certified Radiologist.

 This report was verified electronically.

## 2018-03-14 NOTE — PD.ORT.PN
Subjective


Post Op Day #:  2


Subjective Remarks


knee painful, had to morphine this am.





Objective


Vitals





Vital Signs








  Date Time  Temp Pulse Resp B/P (MAP) Pulse Ox O2 Delivery O2 Flow Rate FiO2


 


3/14/18 04:02  97      


 


3/14/18 04:00 96.9 82 16 135/64 (87) 94   


 


3/14/18 00:07  76      


 


3/14/18 00:00 96.7 84 19 139/69 (92) 92   


 


3/13/18 20:11  77      


 


3/13/18 20:00 96.2 82 16 137/65 (89) 91   


 


3/13/18 17:42  78      


 


3/13/18 16:00 95.8 75 17 102/64 (77) 98   


 


3/13/18 12:00 96.3 73 17 122/73 (89) 98   


 


3/13/18 10:26  78  111/59 (76)    


 


3/13/18 09:10     97 Nasal Cannula 2.00 














I/O      


 


 3/13/18 3/13/18 3/13/18 3/14/18 3/14/18 3/14/18





 07:00 15:00 23:00 07:00 15:00 23:00


 


Intake Total 1791 ml 948 ml 240 ml 0 ml  


 


Output Total 300 ml     


 


Balance 1491 ml 948 ml 240 ml 0 ml  


 


      


 


Intake Oral 360 ml 420 ml 240 ml 0 ml  


 


IV Total 1431 ml 528 ml    


 


Output Urine Total 300 ml     


 


# Voids  1 2 2  


 


# Bowel Movements 0 0 0 0  








Result Diagram:  


3/13/18 0535                                                                   

             3/13/18 0535





Objective Remarks


in bed, nad


dressing with bloody discharge.


dressing changed, no erythema


also bleeding from abdomen from lovenox injection site yesterday am.


painful RLE including calf


nvi





Assessment & Plan


Ortho Post Op Day #:  2


Problem List:  


Assessment and Plan


s/p R TKA


wbat


daily dressing changes


doppler US RLE - patient bleeding from incision site and lovenox injection site

, if doppler negative d/c lovenox and trasition to asa81 BID.


lovenox, d/c on asa81


d/c planning home with [facility] and pt - still very painful.  may need to hold d/c 

today depending upon PT performance.  also may need to consider snf


rx in chart


f/up dr. tse 2 weeks











Tony Don Mar 14, 2018 07:58

## 2018-03-15 VITALS
OXYGEN SATURATION: 94 % | HEART RATE: 90 BPM | SYSTOLIC BLOOD PRESSURE: 151 MMHG | TEMPERATURE: 98.4 F | DIASTOLIC BLOOD PRESSURE: 72 MMHG | RESPIRATION RATE: 18 BRPM

## 2018-03-15 VITALS
OXYGEN SATURATION: 95 % | TEMPERATURE: 99.3 F | SYSTOLIC BLOOD PRESSURE: 142 MMHG | DIASTOLIC BLOOD PRESSURE: 69 MMHG | HEART RATE: 85 BPM | RESPIRATION RATE: 18 BRPM

## 2018-03-15 VITALS
OXYGEN SATURATION: 97 % | HEART RATE: 107 BPM | DIASTOLIC BLOOD PRESSURE: 77 MMHG | RESPIRATION RATE: 18 BRPM | SYSTOLIC BLOOD PRESSURE: 161 MMHG | TEMPERATURE: 98.7 F

## 2018-03-15 VITALS
HEART RATE: 102 BPM | DIASTOLIC BLOOD PRESSURE: 72 MMHG | RESPIRATION RATE: 18 BRPM | OXYGEN SATURATION: 95 % | TEMPERATURE: 98.2 F | SYSTOLIC BLOOD PRESSURE: 151 MMHG

## 2018-03-15 VITALS
TEMPERATURE: 98 F | OXYGEN SATURATION: 90 % | HEART RATE: 104 BPM | RESPIRATION RATE: 16 BRPM | SYSTOLIC BLOOD PRESSURE: 148 MMHG | DIASTOLIC BLOOD PRESSURE: 86 MMHG

## 2018-03-15 VITALS
DIASTOLIC BLOOD PRESSURE: 55 MMHG | SYSTOLIC BLOOD PRESSURE: 115 MMHG | RESPIRATION RATE: 18 BRPM | OXYGEN SATURATION: 99 % | HEART RATE: 66 BPM | TEMPERATURE: 98.8 F

## 2018-03-15 VITALS
HEART RATE: 107 BPM | RESPIRATION RATE: 18 BRPM | OXYGEN SATURATION: 97 % | TEMPERATURE: 98.2 F | DIASTOLIC BLOOD PRESSURE: 84 MMHG | SYSTOLIC BLOOD PRESSURE: 162 MMHG

## 2018-03-15 VITALS
HEART RATE: 90 BPM | TEMPERATURE: 98.6 F | DIASTOLIC BLOOD PRESSURE: 71 MMHG | OXYGEN SATURATION: 98 % | RESPIRATION RATE: 17 BRPM | SYSTOLIC BLOOD PRESSURE: 136 MMHG

## 2018-03-15 VITALS
TEMPERATURE: 98.2 F | HEART RATE: 97 BPM | RESPIRATION RATE: 16 BRPM | SYSTOLIC BLOOD PRESSURE: 118 MMHG | DIASTOLIC BLOOD PRESSURE: 78 MMHG | OXYGEN SATURATION: 94 %

## 2018-03-15 VITALS — OXYGEN SATURATION: 95 %

## 2018-03-15 VITALS
SYSTOLIC BLOOD PRESSURE: 122 MMHG | HEART RATE: 101 BPM | RESPIRATION RATE: 18 BRPM | OXYGEN SATURATION: 94 % | TEMPERATURE: 98.4 F | DIASTOLIC BLOOD PRESSURE: 63 MMHG

## 2018-03-15 VITALS — HEART RATE: 93 BPM

## 2018-03-15 LAB
BUN SERPL-MCNC: 20 MG/DL (ref 7–18)
CALCIUM SERPL-MCNC: 8.4 MG/DL (ref 8.5–10.1)
CHLORIDE SERPL-SCNC: 100 MEQ/L (ref 98–107)
CREAT SERPL-MCNC: 1.12 MG/DL (ref 0.5–1)
ERYTHROCYTE [DISTWIDTH] IN BLOOD BY AUTOMATED COUNT: 14.6 % (ref 11.6–17.2)
GFR SERPLBLD BASED ON 1.73 SQ M-ARVRAT: 60 ML/MIN (ref 89–?)
GLUCOSE SERPL-MCNC: 183 MG/DL (ref 74–106)
HCO3 BLD-SCNC: 31.6 MEQ/L (ref 21–32)
HCT VFR BLD CALC: 32.5 % (ref 35–46)
HGB BLD-MCNC: 10.6 GM/DL (ref 11.6–15.3)
MCH RBC QN AUTO: 29.1 PG (ref 27–34)
MCHC RBC AUTO-ENTMCNC: 32.5 % (ref 32–36)
MCV RBC AUTO: 89.8 FL (ref 80–100)
PLATELET # BLD: 226 TH/MM3 (ref 150–450)
PMV BLD AUTO: 8.6 FL (ref 7–11)
RBC # BLD AUTO: 3.62 MIL/MM3 (ref 4–5.3)
SODIUM SERPL-SCNC: 139 MEQ/L (ref 136–145)
WBC # BLD AUTO: 11.2 TH/MM3 (ref 4–11)

## 2018-03-15 RX ADMIN — ASPIRIN 81 MG SCH MG: 81 TABLET ORAL at 08:22

## 2018-03-15 RX ADMIN — GABAPENTIN SCH MG: 300 CAPSULE ORAL at 21:20

## 2018-03-15 RX ADMIN — INSULIN ASPART SCH: 100 INJECTION, SOLUTION INTRAVENOUS; SUBCUTANEOUS at 12:00

## 2018-03-15 RX ADMIN — ACYCLOVIR SCH UNITS: 800 TABLET ORAL at 21:19

## 2018-03-15 RX ADMIN — METOPROLOL SUCCINATE SCH MG: 25 TABLET, EXTENDED RELEASE ORAL at 08:21

## 2018-03-15 RX ADMIN — TAZOBACTAM SODIUM AND PIPERACILLIN SODIUM SCH MLS/HR: 500; 4 INJECTION, SOLUTION INTRAVENOUS at 23:54

## 2018-03-15 RX ADMIN — SODIUM CHLORIDE SCH MLS/HR: 900 INJECTION INTRAVENOUS at 15:12

## 2018-03-15 RX ADMIN — VITAMIN D, TAB 1000IU (100/BT) SCH UNITS: 25 TAB at 08:34

## 2018-03-15 RX ADMIN — PANTOPRAZOLE SCH MG: 40 TABLET, DELAYED RELEASE ORAL at 08:30

## 2018-03-15 RX ADMIN — LEVOTHYROXINE SODIUM SCH MCG: 25 TABLET ORAL at 05:28

## 2018-03-15 RX ADMIN — FUROSEMIDE SCH MG: 80 TABLET ORAL at 08:21

## 2018-03-15 RX ADMIN — STANDARDIZED SENNA CONCENTRATE AND DOCUSATE SODIUM SCH TAB: 8.6; 5 TABLET, FILM COATED ORAL at 21:21

## 2018-03-15 RX ADMIN — TAZOBACTAM SODIUM AND PIPERACILLIN SODIUM SCH MLS/HR: 500; 4 INJECTION, SOLUTION INTRAVENOUS at 18:00

## 2018-03-15 RX ADMIN — TAZOBACTAM SODIUM AND PIPERACILLIN SODIUM SCH MLS/HR: 500; 4 INJECTION, SOLUTION INTRAVENOUS at 12:44

## 2018-03-15 RX ADMIN — LISINOPRIL SCH MG: 10 TABLET ORAL at 08:22

## 2018-03-15 RX ADMIN — STANDARDIZED SENNA CONCENTRATE AND DOCUSATE SODIUM SCH TAB: 8.6; 5 TABLET, FILM COATED ORAL at 08:22

## 2018-03-15 RX ADMIN — HYDROCODONE BITARTRATE AND ACETAMINOPHEN PRN TAB: 7.5; 325 TABLET ORAL at 03:43

## 2018-03-15 RX ADMIN — MULTIPLE VITAMINS W/ MINERALS TAB SCH TAB: TAB at 21:20

## 2018-03-15 RX ADMIN — TIMOLOL MALEATE SCH DROP: 5 SOLUTION OPHTHALMIC at 08:24

## 2018-03-15 RX ADMIN — TIMOLOL MALEATE SCH DROP: 5 SOLUTION OPHTHALMIC at 21:18

## 2018-03-15 RX ADMIN — INSULIN ASPART SCH: 100 INJECTION, SOLUTION INTRAVENOUS; SUBCUTANEOUS at 17:00

## 2018-03-15 RX ADMIN — MULTIPLE VITAMINS W/ MINERALS TAB SCH TAB: TAB at 08:22

## 2018-03-15 RX ADMIN — BRIMONIDINE TARTRATE SCH DROP: 2 SOLUTION/ DROPS OPHTHALMIC at 21:18

## 2018-03-15 RX ADMIN — INSULIN ASPART SCH: 100 INJECTION, SOLUTION INTRAVENOUS; SUBCUTANEOUS at 21:20

## 2018-03-15 RX ADMIN — ACYCLOVIR SCH UNITS: 800 TABLET ORAL at 08:30

## 2018-03-15 RX ADMIN — BRIMONIDINE TARTRATE SCH DROP: 2 SOLUTION/ DROPS OPHTHALMIC at 08:24

## 2018-03-15 RX ADMIN — INSULIN ASPART SCH: 100 INJECTION, SOLUTION INTRAVENOUS; SUBCUTANEOUS at 08:23

## 2018-03-15 NOTE — RADRPT
EXAM DATE/TIME:  03/15/2018 04:53 

 

[facility] COMPARISON:     

No previous studies available for comparison.

 

                     

INDICATIONS :     

Back pain from a fall.

                     

 

MEDICAL HISTORY :     

Hypertension.  Diabetes mellitus type II.  Myocardial infarction.   Pancreatitis   

 

SURGICAL HISTORY :     

Total knee replacement, right. Hysterectomy. Cholecystectomy. 

 

ENCOUNTER:     

Subsequent                                        

 

ACUITY:     

3 days      

 

PAIN SCORE:     

10/10

 

LOCATION:     

Bilateral pelvis 

 

FINDINGS:     

A single frontal view of the pelvis demonstrates no evidence of fracture.  The bony pelvic ring is in
tact.  Bony mineralization is normal.  The soft tissues are intact.

 

CONCLUSION:     No acute disease.  

 

 

 

 Griffin De Leon Jr., MD on March 15, 2018 at 5:23           

Board Certified Radiologist.

 This report was verified electronically.

## 2018-03-15 NOTE — HHI.PR
Subjective


Remarks


Follow up for diabetes, hypertension.  Patient seen and examined, sitting up in 

chair with apparent dyspnea.  Patient states she took her oxygen off because it 

was bothering her nose.  Monitor at bedside, oxygen saturations are 96% on 4 L 

nasal cannula.  A stat x-ray was ordered.  Vitals are stable at the time.  It 

was found the patient has pulmonate vascular condition versus pneumonia.





Objective


Vitals





Vital Signs








  Date Time  Temp Pulse Resp B/P (MAP) Pulse Ox O2 Delivery O2 Flow Rate FiO2


 


3/15/18 10:11     95 Nasal Cannula 4.00 


 


3/15/18 08:00 98.0 104 16 148/86 (106) 90   


 


3/15/18 06:19 98.4 101 18 122/63 (82) 94   


 


3/15/18 05:19 98.7 107 18 161/77 (105) 97   


 


3/15/18 04:19 98.2 102 18 151/72 (98) 95   


 


3/15/18 03:19 98.2 107 18 162/84 (110) 97   


 


3/15/18 00:25     96 Nasal Cannula 2.00 


 


3/15/18 00:14 98.6 90 17 136/71 (92) 98   


 


3/15/18 00:10  93      


 


3/14/18 20:06 97.8 85 19 149/79 (102) 92   


 


3/14/18 20:05  85      


 


3/14/18 19:54     93 Nasal Cannula 1.00 


 


3/14/18 16:54  84      


 


3/14/18 15:52 97.8 81 17 125/73 (90) 93   


 


3/14/18 13:28     98 Nasal Cannula 1.00 


 


3/14/18 11:54 98.3 96 17 116/54 (74) 95   














I/O      


 


 3/14/18 3/14/18 3/14/18 3/15/18 3/15/18 3/15/18





 07:00 15:00 23:00 07:00 15:00 23:00


 


Intake Total 0 ml 480 ml 480 ml 360 ml  


 


Balance 0 ml 480 ml 480 ml 360 ml  


 


      


 


Intake Oral 0 ml 480 ml 480 ml 360 ml  


 


# Voids 2 2 3 2  


 


# Bowel Movements 0 0 0 0  








Result Diagram:  


3/15/18 0406                                                                   

             3/15/18 0406





Imaging





Last Impressions








Pelvis X-Ray 3/15/18 0000 Signed





Impressions: 





 Service Date/Time:  Thursday, March 15, 2018 04:53 - CONCLUSION: No acute 





 disease.       Griffin De Leon Jr., MD 


 


Lumbar Spine X-Ray 3/15/18 0000 Signed





Impressions: 





 Service Date/Time:  Thursday, March 15, 2018 04:45 - CONCLUSION: No acute 





 disease.       Griffin De Leon Jr., MD 


 


Knee X-Ray 3/15/18 0000 Signed





Impressions: 





 Service Date/Time:  Thursday, March 15, 2018 04:57 - CONCLUSION:  Total knee 





 prosthesis in good position. Small residual joint effusion.     Griffin De Leon Jr., MD 


 


Chest X-Ray 3/15/18 0000 Signed





Impressions: 





 Service Date/Time:  Thursday, March 15, 2018 10:02 - CONCLUSION:  Mild 

perihilar 





 infiltrates consistent with pulmonary vascular congestion or pneumonia. Mild 





 cardiomegaly.     Daquan Chisholm MD 


 


Lower Extremity Ultrasound 3/14/18 0000 Signed





Impressions: 





 Service Date/Time:  Wednesday, March 14, 2018 09:50 - CONCLUSION: No evidence 

of 





 deep venous thrombosis within the right lower extremity.     Daquan Chisholm MD 








Objective Remarks


GENERAL: Well-nourished, well-developed obese female patient with apparent 

shortness of breath.  On supplemental O2.


SKIN: Warm and dry. 


HEENT:  Normocephalic. Atraumatic. Pupils equal and round. Mucous membranes 

pink and moist.


NECK: Supple. Trachea midline.  


CARDIOVASCULAR: Regular rate and rhythm. No murmur appreciated. 


RESPIRATORY: No accessory muscle use.  Right posterior lobe with diminished 

breath sounds. No wheezing or rhonchi.


GASTROINTESTINAL: Abdomen soft, non-tender, nondistended. Normoactive bowel 

sounds x4.


MUSCULOSKELETAL: No obvious deformities. Right knee with surgical dressing, 

CDI. 


NEUROLOGICAL: Awake and alert. No obvious cranial nerve deficits.  Motor 

grossly within normal limits.  Normal speech.


PSYCHIATRIC: Appropriate mood and affect; insight and judgment normal.


Procedures


3/12/18 right total knee arthroplasty





A/P


Problem List:  


(1) Chronic diastolic congestive heart failure


ICD Code:  I50.32 - Chronic diastolic (congestive) heart failure


(2) Primary localized osteoarthrosis, lower leg


ICD Code:  M17.10 - Unilateral primary osteoarthritis, unspecified knee


(3) DM (diabetes mellitus)


ICD Code:  E11.9 - Type 2 diabetes mellitus without complications


Status:  Acute


(4) Hypertension


ICD Code:  I10 - Essential (primary) hypertension


Status:  Acute


Assessment and Plan


57-year-old female with history of insulin dependent diabetes, CKD stage III, 

HTN, diastolic CHF, CAD, hypothyroidism, depression, osteoarthritis, admitted 

to the hospital for right total knee arthroplasty.  Hospitalist consultation 

was requested for medical management.  





Osteoarthritis: Status post right total knee arthroplasty.  


   -Management per orthopedic surgery.  


   -Continue pain control, bowel regimen, physical therapy.


   -Patient with increased right knee/calf pain today, Doppler ultrasound 

negative for DVT.





Chronic diastolic congestive heart failure, mild acute exacerbation


   -Continue Lasix, lisinopril, metoprolol.


   -Chest x-ray showing some mild pulmonary vascular congestion.  Given 

additional dose of IV Lasix today.  Monitor intake and output.


   -Monitor for improvement.  Continue oxygen.





Hospital-acquired pneumonia with apparent inciting dyspnea, tachycardia and 

hypoxia


   Patient requiring increase in O2 dependence, now at 4 L nasal cannula.  

Chest x-ray showing mild perihilar infiltrates consistent with pulmonary 

vascular congestion or pneumonia.  Will diurese gently.  Also started on IV 

antibiotics.  


   Lactic acid ordered, pending.  Will give 1 dose of IV steroids, patient 

diminished in the right lung field.  Sputum culture ordered and pending.  Mild 

leukocytosis.  Follow CBC in a.m.


   D-dimer ordered, pending and follow.  Rule out PE.





Insulin-dependent diabetes mellitus: Glucose has been elevated.  


   -Continue home insulin regimen.  


   -Diabetic diet.  


   -Monitor Accu-Cheks and cover with sliding scale insulin.





Hypertension: Chronic, BP well controlled. 


   -Continue patient's metoprolol, amlodipine, lisinopril.  


   -Monitor BP, adjust antihypertensives as needed





Chronic kidney disease stage III: chronic, stable


   -avoid nephrotoxins


   -Monitor BUN and creatinine.





Hypothyroidism: chronic


   -Continue Synthroid.





Depression: Chronic


   -Continue fluoxetine.





Constipation: suspect secondary to opiates. Last BM 3/11. 


   -Continue michaelle-Colace 2 tabs po bid


   -Constipation protocol meds prn


   -Monitor for BM





DVT prophylaxis: Lovenox.





Problem Qualifiers





(1) Primary localized osteoarthrosis, lower leg:  


Qualified Codes:  M17.11 - Unilateral primary osteoarthritis, right knee








SamanthaCheyenneJocelinechristopher CHINCHILLA Mar 15, 2018 10:12

## 2018-03-15 NOTE — RADRPT
EXAM DATE/TIME:  03/15/2018 04:57 

 

[facility] COMPARISON:     

KNEE RIGHT LTD (1 OR 2 VWS), 2018, 11:04.

 

                     

INDICATIONS :     

Bilateral knee pain from a fall.

                     

 

MEDICAL HISTORY :     

Hypertension.  Diabetes mellitus type II.  Pancreatitis.   MI CVA   

 

SURGICAL HISTORY :     

Total knee replacement, right.  section. Hysterectomy. 

 

ENCOUNTER:     

Subsequent                                        

 

ACUITY:     

3 days      

 

PAIN SCORE:     

10/10

 

LOCATION:     

Bilateral  knees

 

FINDINGS:     

2 views of the knee show a total knee prosthesis in good position. A small amount of fluid noted with
in the joint. No fracture or dislocation is observed. Soft tissue swelling is noted.

 

 

CONCLUSION:     

Total knee prosthesis in good position. Small residual joint effusion.

 

 

 

 Griffin De Leon Jr., MD on March 15, 2018 at 5:24           

Board Certified Radiologist.

 This report was verified electronically.

## 2018-03-15 NOTE — RADRPT
EXAM DATE/TIME:  03/15/2018 04:55 

 

[facility] COMPARISON:     

No previous studies available for comparison.

 

                     

INDICATIONS :     

Bilateral knee pain from a fall.

                     

 

MEDICAL HISTORY :     

Hypertension.  Diabetes mellitus type II.  Pancreatitis.   MI CVA   

 

SURGICAL HISTORY :     

Total knee replacement, right.  section. Hysterectomy. 

 

ENCOUNTER:     

Subsequent                                        

 

ACUITY:     

3 days      

 

PAIN SCORE:     

10/10

 

LOCATION:     

Bilateral  knees

 

FINDINGS:     

Multiple views of the knee show joint space narrowing with periarticular sclerotic change and osteoph
yte production most pronounced within the medial compartment. No fracture or dislocation. No joint ef
fusion. Soft tissues are unremarkable.

 

CONCLUSION:     

Advanced tricompartmental osteoarthritis. No acute abnormality.

 

 

 

 Griffin De Leon Jr., MD on March 15, 2018 at 5:23           

Board Certified Radiologist.

 This report was verified electronically.

## 2018-03-15 NOTE — RADRPT
EXAM DATE/TIME:  03/15/2018 10:02 

 

[facility] COMPARISON:     

CHEST SINGLE AP, 2017, 1:50.

 

                     

INDICATIONS :     

Short of breath.

                     

 

MEDICAL HISTORY :     

Hypertension.  Diabetes mellitus type II.  Pancreatitis.   Myocardial infarction. CVA.   

 

SURGICAL HISTORY :     

Total knee replacement, right.  section. Hysterectomy. 

 

ENCOUNTER:     

Subsequent                                        

 

ACUITY:     

4 - 6 days      

 

PAIN SCORE:     

0/10

 

LOCATION:     

Bilateral chest 

 

FINDINGS:     

The heart is mildly prominent. Mild perihilar infiltrates are noted consistent with pulmonary vascula
r congestion or pneumonia.

 

CONCLUSION:     

Mild perihilar infiltrates consistent with pulmonary vascular congestion or pneumonia. Mild cardiomeg
wilder. 

 

 

 Daquan Chisholm MD on March 15, 2018 at 10:47           

Board Certified Radiologist.

 This report was verified electronically.

## 2018-03-15 NOTE — RADRPT
EXAM DATE/TIME:  03/15/2018 04:45 

 

[facility] COMPARISON:     

No previous studies available for comparison.

 

                     

INDICATIONS :     

Back pain from a fall.

                     

 

MEDICAL HISTORY :     

Myocardial infarction.  Congestive heart failure.  Pancreatitis.   CVA   

 

SURGICAL HISTORY :     

 section. Hysterectomy. Cholecystectomy. 

 

ENCOUNTER:     

Subsequent                                        

 

ACUITY:     

3 days      

 

PAIN SCORE:     

10/10

 

LOCATION:     

Bilateral  Back

 

FINDINGS:     

There are five non-rib bearing vertebral bodies.  The vertebral bodies are in normal alignment withou
t evidence of subluxation or scoliosis.  The disc spaces are maintained.  The posterior elements are 
intact without evidence of spondylolysis.  The pedicles are intact.  Bony mineralization is normal.  
No fracture is identified.

 

CONCLUSION:     No acute disease.  

 

 

 

 Griffin De Leon Jr., MD on March 15, 2018 at 5:22           

Board Certified Radiologist.

 This report was verified electronically.

## 2018-03-15 NOTE — PD.ORT.PN
Subjective


Post Op Day #:  3


Subjective Remarks


knee pain improving. did have a fall this am getting OOB and tripped over SCD 

chord.  xrays negative.





Objective


Vitals





Vital Signs








  Date Time  Temp Pulse Resp B/P (MAP) Pulse Ox O2 Delivery O2 Flow Rate FiO2


 


3/15/18 06:19 98.4 101 18 122/63 (82) 94   


 


3/15/18 05:19 98.7 107 18 161/77 (105) 97   


 


3/15/18 04:19 98.2 102 18 151/72 (98) 95   


 


3/15/18 03:19 98.2 107 18 162/84 (110) 97   


 


3/15/18 00:25     96 Nasal Cannula 2.00 


 


3/15/18 00:14 98.6 90 17 136/71 (92) 98   


 


3/15/18 00:10  93      


 


3/14/18 20:06 97.8 85 19 149/79 (102) 92   


 


3/14/18 20:05  85      


 


3/14/18 19:54     93 Nasal Cannula 1.00 


 


3/14/18 16:54  84      


 


3/14/18 15:52 97.8 81 17 125/73 (90) 93   


 


3/14/18 13:28     98 Nasal Cannula 1.00 


 


3/14/18 11:54 98.3 96 17 116/54 (74) 95   


 


3/14/18 08:00 98.7 111 17 130/64 (86) 97   














I/O      


 


 3/14/18 3/14/18 3/14/18 3/15/18 3/15/18 3/15/18





 07:00 15:00 23:00 07:00 15:00 23:00


 


Intake Total 0 ml 480 ml 480 ml 360 ml  


 


Balance 0 ml 480 ml 480 ml 360 ml  


 


      


 


Intake Oral 0 ml 480 ml 480 ml 360 ml  


 


# Voids 2 2 3 2  


 


# Bowel Movements 0 0 0 0  








Result Diagram:  


3/15/18 0406                                                                   

             3/15/18 0406





Imaging





Last 24 hours Impressions








Pelvis X-Ray 3/15/18 0000 Signed





Impressions: 





 Service Date/Time:  Thursday, March 15, 2018 04:53 - CONCLUSION: No acute 





 disease.       Griffin De Leon Jr., MD 


 


Lumbar Spine X-Ray 3/15/18 0000 Signed





Impressions: 





 Service Date/Time:  Thursday, March 15, 2018 04:45 - CONCLUSION: No acute 





 disease.       Griffin De Leon Jr., MD 


 


Knee X-Ray 3/15/18 0000 Signed





Impressions: 





 Service Date/Time:  Thursday, March 15, 2018 04:57 - CONCLUSION:  Total knee 





 prosthesis in good position. Small residual joint effusion.     Griffin De Leon Jr., MD 


 


Knee X-Ray 3/15/18 0000 Signed





Impressions: 





 Service Date/Time:  Thursday, March 15, 2018 04:55 - CONCLUSION:  Advanced 





 tricompartmental osteoarthritis. No acute abnormality.     Griffin De Leon Jr., MD 








Objective Remarks


in bed, nad


dressing with mild bloody discharge.


dressing changed, no erythema


bleeding from abdomen from lovenox injection site yesterday am - stopped.


nvi





Assessment & Plan


Ortho Post Op Day #:  3


Problem List:  


Assessment and Plan


s/p R TKA


wbat


daily dressing changes


doppler US RLE - negative


fall this am tripping over SCD chord - xrays negative


patient bleeding from incision site and lovenox injection site,  d/c lovenox 

and trasition to asa81 


discussed d/c home vs snf with patient in detail.  declines snf.  states she 

feels she will be safe at home.


d/c planning home with hhc and pt - cleared today after PT


rx in chart


f/up dr. tse 2 weeks











Tony Don Mar 15, 2018 07:54

## 2018-03-16 VITALS
SYSTOLIC BLOOD PRESSURE: 137 MMHG | HEART RATE: 92 BPM | DIASTOLIC BLOOD PRESSURE: 67 MMHG | RESPIRATION RATE: 19 BRPM | TEMPERATURE: 98.5 F | OXYGEN SATURATION: 98 %

## 2018-03-16 VITALS
TEMPERATURE: 98.1 F | DIASTOLIC BLOOD PRESSURE: 69 MMHG | RESPIRATION RATE: 18 BRPM | OXYGEN SATURATION: 100 % | SYSTOLIC BLOOD PRESSURE: 124 MMHG | HEART RATE: 97 BPM

## 2018-03-16 VITALS
HEART RATE: 94 BPM | TEMPERATURE: 98.4 F | OXYGEN SATURATION: 100 % | SYSTOLIC BLOOD PRESSURE: 135 MMHG | RESPIRATION RATE: 18 BRPM | DIASTOLIC BLOOD PRESSURE: 79 MMHG

## 2018-03-16 VITALS
OXYGEN SATURATION: 96 % | RESPIRATION RATE: 19 BRPM | DIASTOLIC BLOOD PRESSURE: 72 MMHG | TEMPERATURE: 99 F | SYSTOLIC BLOOD PRESSURE: 142 MMHG | HEART RATE: 100 BPM

## 2018-03-16 VITALS
TEMPERATURE: 98.1 F | OXYGEN SATURATION: 96 % | DIASTOLIC BLOOD PRESSURE: 76 MMHG | SYSTOLIC BLOOD PRESSURE: 138 MMHG | HEART RATE: 84 BPM | RESPIRATION RATE: 18 BRPM

## 2018-03-16 VITALS
SYSTOLIC BLOOD PRESSURE: 111 MMHG | OXYGEN SATURATION: 97 % | DIASTOLIC BLOOD PRESSURE: 69 MMHG | RESPIRATION RATE: 18 BRPM | HEART RATE: 76 BPM | TEMPERATURE: 97.7 F

## 2018-03-16 VITALS — HEART RATE: 105 BPM

## 2018-03-16 VITALS — OXYGEN SATURATION: 100 %

## 2018-03-16 VITALS — OXYGEN SATURATION: 99 %

## 2018-03-16 LAB
BASOPHILS # BLD AUTO: 0 TH/MM3 (ref 0–0.2)
BASOPHILS NFR BLD: 0 % (ref 0–2)
BUN SERPL-MCNC: 16 MG/DL (ref 7–18)
CALCIUM SERPL-MCNC: 8.5 MG/DL (ref 8.5–10.1)
CHLORIDE SERPL-SCNC: 98 MEQ/L (ref 98–107)
CREAT SERPL-MCNC: 1.12 MG/DL (ref 0.5–1)
EOSINOPHIL # BLD: 0 TH/MM3 (ref 0–0.4)
EOSINOPHIL NFR BLD: 0 % (ref 0–4)
ERYTHROCYTE [DISTWIDTH] IN BLOOD BY AUTOMATED COUNT: 14.5 % (ref 11.6–17.2)
GFR SERPLBLD BASED ON 1.73 SQ M-ARVRAT: 60 ML/MIN (ref 89–?)
GLUCOSE SERPL-MCNC: 250 MG/DL (ref 74–106)
HCO3 BLD-SCNC: 35.3 MEQ/L (ref 21–32)
HCT VFR BLD CALC: 30.3 % (ref 35–46)
HGB BLD-MCNC: 9.9 GM/DL (ref 11.6–15.3)
LYMPHOCYTES # BLD AUTO: 0.5 TH/MM3 (ref 1–4.8)
LYMPHOCYTES NFR BLD AUTO: 5.2 % (ref 9–44)
MCH RBC QN AUTO: 29.6 PG (ref 27–34)
MCHC RBC AUTO-ENTMCNC: 32.8 % (ref 32–36)
MCV RBC AUTO: 90.4 FL (ref 80–100)
MONOCYTE #: 0.2 TH/MM3 (ref 0–0.9)
MONOCYTES NFR BLD: 1.5 % (ref 0–8)
NEUTROPHILS # BLD AUTO: 9.3 TH/MM3 (ref 1.8–7.7)
NEUTROPHILS NFR BLD AUTO: 93.3 % (ref 16–70)
PLATELET # BLD: 240 TH/MM3 (ref 150–450)
PMV BLD AUTO: 8.5 FL (ref 7–11)
RBC # BLD AUTO: 3.35 MIL/MM3 (ref 4–5.3)
SODIUM SERPL-SCNC: 140 MEQ/L (ref 136–145)
WBC # BLD AUTO: 9.9 TH/MM3 (ref 4–11)

## 2018-03-16 RX ADMIN — GABAPENTIN SCH MG: 300 CAPSULE ORAL at 21:28

## 2018-03-16 RX ADMIN — TAZOBACTAM SODIUM AND PIPERACILLIN SODIUM SCH MLS/HR: 500; 4 INJECTION, SOLUTION INTRAVENOUS at 12:00

## 2018-03-16 RX ADMIN — BRIMONIDINE TARTRATE SCH DROP: 2 SOLUTION/ DROPS OPHTHALMIC at 21:28

## 2018-03-16 RX ADMIN — SODIUM CHLORIDE SCH MLS/HR: 900 INJECTION INTRAVENOUS at 08:00

## 2018-03-16 RX ADMIN — INSULIN ASPART SCH: 100 INJECTION, SOLUTION INTRAVENOUS; SUBCUTANEOUS at 08:00

## 2018-03-16 RX ADMIN — ASPIRIN 81 MG SCH MG: 81 TABLET ORAL at 08:02

## 2018-03-16 RX ADMIN — TIMOLOL MALEATE SCH DROP: 5 SOLUTION OPHTHALMIC at 21:28

## 2018-03-16 RX ADMIN — STANDARDIZED SENNA CONCENTRATE AND DOCUSATE SODIUM SCH TAB: 8.6; 5 TABLET, FILM COATED ORAL at 21:30

## 2018-03-16 RX ADMIN — ACYCLOVIR SCH UNITS: 800 TABLET ORAL at 08:00

## 2018-03-16 RX ADMIN — TAZOBACTAM SODIUM AND PIPERACILLIN SODIUM SCH MLS/HR: 500; 4 INJECTION, SOLUTION INTRAVENOUS at 06:21

## 2018-03-16 RX ADMIN — LISINOPRIL SCH MG: 10 TABLET ORAL at 08:02

## 2018-03-16 RX ADMIN — MULTIPLE VITAMINS W/ MINERALS TAB SCH TAB: TAB at 21:30

## 2018-03-16 RX ADMIN — INSULIN ASPART SCH: 100 INJECTION, SOLUTION INTRAVENOUS; SUBCUTANEOUS at 12:00

## 2018-03-16 RX ADMIN — MULTIPLE VITAMINS W/ MINERALS TAB SCH TAB: TAB at 08:02

## 2018-03-16 RX ADMIN — HYDROCODONE BITARTRATE AND ACETAMINOPHEN PRN TAB: 7.5; 325 TABLET ORAL at 16:00

## 2018-03-16 RX ADMIN — HYDROCODONE BITARTRATE AND ACETAMINOPHEN PRN TAB: 7.5; 325 TABLET ORAL at 23:22

## 2018-03-16 RX ADMIN — PANTOPRAZOLE SCH MG: 40 TABLET, DELAYED RELEASE ORAL at 08:02

## 2018-03-16 RX ADMIN — STANDARDIZED SENNA CONCENTRATE AND DOCUSATE SODIUM SCH TAB: 8.6; 5 TABLET, FILM COATED ORAL at 08:03

## 2018-03-16 RX ADMIN — LEVOTHYROXINE SODIUM SCH MCG: 25 TABLET ORAL at 06:22

## 2018-03-16 RX ADMIN — TAZOBACTAM SODIUM AND PIPERACILLIN SODIUM SCH MLS/HR: 500; 4 INJECTION, SOLUTION INTRAVENOUS at 17:00

## 2018-03-16 RX ADMIN — FUROSEMIDE SCH MG: 80 TABLET ORAL at 08:08

## 2018-03-16 RX ADMIN — METOPROLOL SUCCINATE SCH MG: 25 TABLET, EXTENDED RELEASE ORAL at 08:02

## 2018-03-16 RX ADMIN — HYDROCODONE BITARTRATE AND ACETAMINOPHEN PRN TAB: 7.5; 325 TABLET ORAL at 06:21

## 2018-03-16 RX ADMIN — VITAMIN D, TAB 1000IU (100/BT) SCH UNITS: 25 TAB at 08:02

## 2018-03-16 RX ADMIN — INSULIN ASPART SCH: 100 INJECTION, SOLUTION INTRAVENOUS; SUBCUTANEOUS at 17:00

## 2018-03-16 RX ADMIN — TIMOLOL MALEATE SCH DROP: 5 SOLUTION OPHTHALMIC at 09:00

## 2018-03-16 RX ADMIN — BRIMONIDINE TARTRATE SCH DROP: 2 SOLUTION/ DROPS OPHTHALMIC at 09:00

## 2018-03-16 RX ADMIN — INSULIN ASPART SCH: 100 INJECTION, SOLUTION INTRAVENOUS; SUBCUTANEOUS at 21:30

## 2018-03-16 NOTE — RADRPT
EXAM DATE/TIME:  03/16/2018 18:00 

 

[facility] COMPARISON:     

CT PULMONARY ANGIOGRAM, August 26, 2017, 4:16.

 

 

INDICATIONS :     

Short of breath with chest pains.

                      

 

IV CONTRAST:     

75 cc Omnipaque 350 (iohexol) IV 

 

 

RADIATION DOSE:     

10.55 CTDIvol (mGy) 

 

 

MEDICAL HISTORY :     

Cardiovascular disease. Hypertension. Diabetes mellitus type 2.

 

SURGICAL HISTORY :      

None. 

 

ENCOUNTER:      

Initial

 

ACUITY:      

1 day

 

PAIN SCALE:      

8/10

 

LOCATION:       

Bilateral chest 

 

TECHNIQUE:     

Volumetric scanning of the chest was performed using a pulmonary embolism protocol MIP images were re
constructed.  Using automated exposure control and adjustment of the mA and/or kV according to patien
t size, radiation dose was kept as low as reasonably achievable to obtain optimal diagnostic quality 
images.   DICOM format image data is available electronically for review and comparison.  

 

Follow-up recommendations for detected pulmonary nodules are based at a minimum on nodule size and pa
tient risk factors according to Fleischner Society Guidelines.

 

FINDINGS:     

 

PULMONARY ARTERIES:

No filling defects are seen in the pulmonary arteries through the segmental level.

 

LUNGS:     

There is no consolidation or pneumothorax .  No concerning pulmonary nodule is visualized.

 

PLEURAE:     

There is no pleural thickening or pleural effusion.

 

MEDIASTINUM:     

There is good visualization of the great vessels of the middle mediastinum.  No evidence of mediastin
al or hilar adenopathy/mass.

 

MUSCULOSKELETAL:     

Within normal limits for patient age.

 

MISCELLANEOUS:     

The visualized upper abdominal organs demonstrate no acute abnormality.

 

CONCLUSION:     

No pulmonary embolus or other acute cardiopulmonary disease.

 

 

 

 

 Fortunato Infante MD on March 16, 2018 at 18:21           

Board Certified Radiologist.

 This report was verified electronically.

## 2018-03-16 NOTE — PD.ORT.PN
Subjective


Post Op Day #:  4


Subjective Remarks


POD #4 R TKA Dr Tse





Pt sitting upright in a chair, RN at bedside, O2 cannula on. States she is 

comfortable and knee pain is minimal. Medical team is working her up for 

pneumonia.





Objective


Vitals





Vital Signs








  Date Time  Temp Pulse Resp B/P (MAP) Pulse Ox O2 Delivery O2 Flow Rate FiO2


 


3/16/18 03:19 99.0 100 19 142/72 (95) 96   


 


3/15/18 23:19 98.4 90 18 151/72 (98) 94   


 


3/15/18 21:25      Nasal Cannula 4.00 


 


3/15/18 21:20      Nasal Cannula 4.00 


 


3/15/18 20:19 98.8 66 18 115/55 (75) 99   


 


3/15/18 16:13 99.3 85 18 142/69 (93) 95   


 


3/15/18 12:00 98.2 97 16 118/78 (91) 94   


 


3/15/18 10:11     95 Nasal Cannula 4.00 


 


3/15/18 08:40     91 Nasal Cannula 4.00 














I/O      


 


 3/15/18 3/15/18 3/15/18 3/16/18 3/16/18 3/16/18





 07:00 15:00 23:00 07:00 15:00 23:00


 


Intake Total 360 ml  480 ml 360 ml  


 


Balance 360 ml  480 ml 360 ml  


 


      


 


Intake Oral 360 ml  480 ml 360 ml  


 


# Voids 2  3 3  


 


# Bowel Movements 0  0 1  








Result Diagram:  


3/16/18 0618                                                                   

             3/16/18 0618





Imaging





Last 24 hours Impressions








Pelvis X-Ray 3/15/18 0000 Signed





Impressions: 





 Service Date/Time:  Thursday, March 15, 2018 04:53 - CONCLUSION: No acute 





 disease.       Griffin De Leon Jr., MD 


 


Lumbar Spine X-Ray 3/15/18 0000 Signed





Impressions: 





 Service Date/Time:  Thursday, March 15, 2018 04:45 - CONCLUSION: No acute 





 disease.       Griffin De Leon Jr., MD 


 


Knee X-Ray 3/15/18 0000 Signed





Impressions: 





 Service Date/Time:  Thursday, March 15, 2018 04:57 - CONCLUSION:  Total knee 





 prosthesis in good position. Small residual joint effusion.     Griffin De Leon Jr., MD 


 


Knee X-Ray 3/15/18 0000 Signed





Impressions: 





 Service Date/Time:  Thursday, March 15, 2018 04:55 - CONCLUSION:  Advanced 





 tricompartmental osteoarthritis. No acute abnormality.     Griffin De Leon Jr., MD 








Objective Remarks


in chair, nad


dressing clean and dry


mild tenderness around knee joint


no calf pain, NVI


nvi





Assessment & Plan


Ortho Post Op Day #:  4


Problem List:  


(1) Primary localized osteoarthrosis, lower leg


ICD Codes:  M17.10 - Unilateral primary osteoarthritis, unspecified knee


Qualifiers:  


   Qualified Codes:  M17.11 - Unilateral primary osteoarthritis, right knee


(2) Hypertension


ICD Codes:  I10 - Essential (primary) hypertension


Status:  Acute


(3) DM (diabetes mellitus)


ICD Codes:  E11.9 - Type 2 diabetes mellitus without complications


Status:  Acute


(4) Chronic diastolic congestive heart failure


ICD Codes:  I50.32 - Chronic diastolic (congestive) heart failure


Assessment and Plan


s/p R TKA





wbat


daily dressing changes


doppler US RLE - negative


fall this am tripping over SCD chord - xrays negative


patient bleeding from incision site and lovenox injection site,  d/c lovenox 

and trasition to asa81 once daily


discussed d/c home vs snf with patient in detail.  declines snf.  states she 

feels she will be safe at home.


d/c planning home with [facility]hc and pt 


rx in chart


Orthopedic clear for d/c when medically cleared. Awaiting recommendations in 

regards to transition to home abx treatment


f/up dr. tse 2 weeks











Seble Moreno Mar 16, 2018 08:14

## 2018-03-17 VITALS
TEMPERATURE: 98.1 F | RESPIRATION RATE: 18 BRPM | DIASTOLIC BLOOD PRESSURE: 59 MMHG | HEART RATE: 64 BPM | OXYGEN SATURATION: 97 % | SYSTOLIC BLOOD PRESSURE: 114 MMHG

## 2018-03-17 VITALS — OXYGEN SATURATION: 96 %

## 2018-03-17 VITALS
HEART RATE: 73 BPM | DIASTOLIC BLOOD PRESSURE: 67 MMHG | OXYGEN SATURATION: 96 % | SYSTOLIC BLOOD PRESSURE: 118 MMHG | TEMPERATURE: 97.9 F | RESPIRATION RATE: 18 BRPM

## 2018-03-17 VITALS
DIASTOLIC BLOOD PRESSURE: 63 MMHG | TEMPERATURE: 97.2 F | HEART RATE: 82 BPM | OXYGEN SATURATION: 98 % | SYSTOLIC BLOOD PRESSURE: 133 MMHG | RESPIRATION RATE: 19 BRPM

## 2018-03-17 VITALS — HEART RATE: 72 BPM

## 2018-03-17 RX ADMIN — VITAMIN D, TAB 1000IU (100/BT) SCH UNITS: 25 TAB at 08:22

## 2018-03-17 RX ADMIN — STANDARDIZED SENNA CONCENTRATE AND DOCUSATE SODIUM SCH TAB: 8.6; 5 TABLET, FILM COATED ORAL at 08:22

## 2018-03-17 RX ADMIN — LEVOTHYROXINE SODIUM SCH MCG: 25 TABLET ORAL at 06:23

## 2018-03-17 RX ADMIN — ASPIRIN 81 MG SCH MG: 81 TABLET ORAL at 08:21

## 2018-03-17 RX ADMIN — INSULIN ASPART SCH: 100 INJECTION, SOLUTION INTRAVENOUS; SUBCUTANEOUS at 08:00

## 2018-03-17 RX ADMIN — ACYCLOVIR SCH UNITS: 800 TABLET ORAL at 12:30

## 2018-03-17 RX ADMIN — MULTIPLE VITAMINS W/ MINERALS TAB SCH TAB: TAB at 08:22

## 2018-03-17 RX ADMIN — LISINOPRIL SCH MG: 10 TABLET ORAL at 08:21

## 2018-03-17 RX ADMIN — METOPROLOL SUCCINATE SCH MG: 25 TABLET, EXTENDED RELEASE ORAL at 08:22

## 2018-03-17 RX ADMIN — HYDROCODONE BITARTRATE AND ACETAMINOPHEN PRN TAB: 7.5; 325 TABLET ORAL at 04:15

## 2018-03-17 RX ADMIN — INSULIN ASPART SCH: 100 INJECTION, SOLUTION INTRAVENOUS; SUBCUTANEOUS at 12:00

## 2018-03-17 RX ADMIN — PANTOPRAZOLE SCH MG: 40 TABLET, DELAYED RELEASE ORAL at 08:22

## 2018-03-17 NOTE — HHI.PR
Subjective


Remarks


3-16 Follow-up on patient status post right total knee arthroplasty.  Patient 

seen and examined.  s/p fall earlier today after tripping on cords, bleeding 

from incision site.  Lovenox discontinued and changed to po ASA per Ortho.  

Patient complaining of right sided flank pain, nonradicular, tender to the 

touch.  She denies any fever or chills but she denies any shortness of breath 

or chest pain.  She denies any nausea, vomiting or abdominal pain.  Patient 

continues to desaturate off of oxygen.  She does not use oxygen at home.





3-17 WANTS TO GO HOME TODAY


[facility] AND EQU[facility]MENT


WILL DC TO HOME


PASSED WALK TEST-NO NEED FOR OXYGEN





Wants to go home


We will cleared medically





Objective


Vitals





Vital Signs








  Date Time  Temp Pulse Resp B/P (MAP) Pulse Ox O2 Delivery O2 Flow Rate FiO2


 


3/17/18 13:37     96   


 


3/17/18 12:00 97.9 73 18 118/67 (84) 96   


 


3/17/18 08:00 98.1 64 18 114/59 (77) 97   


 


3/17/18 04:17 97.2 82 19 133/63 (86) 98   


 


3/17/18 03:55  72      


 


3/16/18 23:30 98.1 84 18 138/76 (96) 96   


 


3/16/18 21:32      Nasal Cannula 4.00 


 


3/16/18 20:11     99 Nasal Cannula 4.00 


 


3/16/18 20:09 98.5 92 19 137/67 (90) 98   


 


3/16/18 20:00  105      


 


3/16/18 16:00 98.4 94 18 135/79 (97) 100   














I/O      


 


 3/16/18 3/16/18 3/16/18 3/17/18 3/17/18 3/17/18





 06:59 14:59 22:59 06:59 14:59 22:59


 


Intake Total 360 ml  360 ml 360 ml  


 


Balance 360 ml  360 ml 360 ml  


 


      


 


Intake Oral 360 ml  360 ml 360 ml  


 


# Voids 3  3 2  


 


# Bowel Movements 1  0 0  








Result Diagram:  


3/16/18 0618                                                                   

             3/16/18 0618





Other Results





 Laboratory Tests








Test


  3/15/18


04:06 3/16/18


00:15 3/16/18


00:40 3/16/18


06:18


 


White Blood Count 11.2 TH/MM3    9.9 TH/MM3 


 


Red Blood Count 3.62 MIL/MM3    3.35 MIL/MM3 


 


Hemoglobin 10.6 GM/DL    9.9 GM/DL 


 


Hematocrit 32.5 %    30.3 % 


 


Mean Corpuscular Volume 89.8 FL    90.4 FL 


 


Mean Corpuscular Hemoglobin 29.1 PG    29.6 PG 


 


Mean Corpuscular Hemoglobin


Concent 32.5 % 


  


  


  32.8 % 


 


 


Red Cell Distribution Width 14.6 %    14.5 % 


 


Platelet Count 226 TH/MM3    240 TH/MM3 


 


Mean Platelet Volume 8.6 FL    8.5 FL 


 


Blood Urea Nitrogen 20 MG/DL    16 MG/DL 


 


Creatinine 1.12 MG/DL    1.12 MG/DL 


 


Random Glucose 183 MG/DL    250 MG/DL 


 


Calcium Level 8.4 MG/DL    8.5 MG/DL 


 


Sodium Level 139 MEQ/L    140 MEQ/L 


 


Potassium Level 3.6 MEQ/L    4.1 MEQ/L 


 


Chloride Level 100 MEQ/L    98 MEQ/L 


 


Carbon Dioxide Level 31.6 MEQ/L    35.3 MEQ/L 


 


Anion Gap 7 MEQ/L    7 MEQ/L 


 


Estimat Glomerular Filtration


Rate 60 ML/MIN 


  


  


  60 ML/MIN 


 


 


Lactic Acid Level  1.2 mmol/L   


 


D-Dimer Quantitative (PE/DVT)   2.37 MG/L FEU  


 


Neutrophils (%) (Auto)    93.3 % 


 


Lymphocytes (%) (Auto)    5.2 % 


 


Monocytes (%) (Auto)    1.5 % 


 


Eosinophils (%) (Auto)    0.0 % 


 


Basophils (%) (Auto)    0.0 % 


 


Neutrophils # (Auto)    9.3 TH/MM3 


 


Lymphocytes # (Auto)    0.5 TH/MM3 


 


Monocytes # (Auto)    0.2 TH/MM3 


 


Eosinophils # (Auto)    0.0 TH/MM3 


 


Basophils # (Auto)    0.0 TH/MM3 


 


CBC Comment    DIFF FINAL 


 


Differential Comment     


 


B-Type Natriuretic Peptide    61 PG/ML 








Imaging





Last Impressions








CT Angiography 3/16/18 0000 Signed





Impressions: 





 Service Date/Time:  Friday, March 16, 2018 18:00 - CONCLUSION:  No pulmonary 





 embolus or other acute cardiopulmonary disease.      Fortunato Infante MD 


 


Pelvis X-Ray 3/15/18 0000 Signed





Impressions: 





 Service Date/Time:  Thursday, March 15, 2018 04:53 - CONCLUSION: No acute 





 disease.       Griffin De Leon Jr., MD 


 


Lumbar Spine X-Ray 3/15/18 0000 Signed





Impressions: 





 Service Date/Time:  Thursday, March 15, 2018 04:45 - CONCLUSION: No acute 





 disease.       Griffin De Leon Jr., MD 


 


Knee X-Ray 3/15/18 0000 Signed





Impressions: 





 Service Date/Time:  Thursday, March 15, 2018 04:57 - CONCLUSION:  Total knee 





 prosthesis in good position. Small residual joint effusion.     Griffin De Leon Jr., MD 


 


Chest X-Ray 3/15/18 0000 Signed





Impressions: 





 Service Date/Time:  Thursday, March 15, 2018 10:02 - CONCLUSION:  Mild 

perihilar 





 infiltrates consistent with pulmonary vascular congestion or pneumonia. Mild 





 cardiomegaly.     Daquan Chisholm MD 


 


Lower Extremity Ultrasound 3/14/18 0000 Signed





Impressions: 





 Service Date/Time:  Wednesday, March 14, 2018 09:50 - CONCLUSION: No evidence 

of 





 deep venous thrombosis within the right lower extremity.     Daquan Chisholm MD 








Objective Remarks


GENERAL: Awake alert and oriented 3 talkative and cooperative appears to be 

stated age


SKIN: Warm and dry.


HEAD: Atraumatic. Normocephalic. 


EYES: Pupils equal and round. No scleral icterus. No injection or drainage.  

Extraocular muscles intact


ENT: No nasal bleeding or discharge.  Mucous membranes pink and moist.  Tongue 

is midline


NECK: Trachea midline. No JVD.  Supple


CARDIOVASCULAR: Regular rate and rhythm.  S1-S2 no S3-S4


RESPIRATORY: No accessory muscle use. Clear to auscultation. Breath sounds 

equal bilaterally.  Decreased breath sounds bilaterally


GASTROINTESTINAL: Abdomen soft, non-tender, nondistended. Hepatic and splenic 

margins not palpable. 


MUSCULOSKELETAL: Extremities without clubbing, cyanosis, or edema. No obvious 

deformities.  Some edema bilateral lower extremities right worse than left


NEUROLOGICAL: Awake and alert. No obvious cranial nerve deficits.  Motor 

grossly within normal limits.  4 out of 5 muscle strength in the arms and legs.

  Normal speech.


PSYCHIATRIC: Appropriate mood and affect; insight and judgment normal.


Procedures


3/12/18 right total knee arthroplasty


Medications and IVs





Current Medications


Lactated Ringer's 1,000 ml @  30 mls/hr Q24H PRN IV SEE LABEL COMMENTS Last 

administered on 3/12/18at 06:45;  Start 3/12/18 at 06:30;  Stop 3/13/18 at 23:25

;  Status DC


Sodium Chloride 500 ml @  30 mls/hr [id number] PRN IV SEE LABEL COMMENTS;  Start 3/

12/18 at 06:30;  Stop 3/13/18 at 23:25;  Status DC


Metoprolol Tartrate (Lopressor) 25 mg ON CALL  PRN PO SEE LABEL COMMENTS;  

Start 3/12/18 at 06:30;  Stop 3/13/18 at 23:25;  Status DC


Povidone Iodine (Betadine 5% Antisepsis Kit) 1 applic ON CALL  PRN EACH NARE 

SEE LABEL COMMENTS Last administered on 3/12/18at 07:00;  Start 3/12/18 at 06:30

;  Stop 3/15/18 at 06:29;  Status DC


Chlorhexidine Gluconate (Chlorhexidine 2% Cloth) 3 pack ON CALL  PRN TOPICAL 

SEE LABEL COMMENTS Last administered on 3/12/18at 06:30;  Start 3/12/18 at 06:30

;  Stop 3/13/18 at 23:25;  Status DC


Povidone Iodine (Betadine 7.5% Scrub) 1 applic ONCE TOPICAL ;  Start 3/12/18 at 

06:45;  Stop 3/13/18 at 23:25;  Status DC


Chlorhexidine Gluconate (Hibiclens 4% Top Soln) 1 applic ONCE TOPICAL ;  Start 3

/12/18 at 06:45;  Stop 3/13/18 at 23:25;  Status DC


Cefazolin Sodium/ Dextrose 50 ml @  100 mls/hr ON  CALL IV  Last administered 

on 3/12/18at 08:04;  Start 3/12/18 at 06:45;  Stop 3/13/18 at 06:44;  Status DC


Vancomycin HCl 1000 mg/Sodium Chloride 250 ml @  250 mls/hr ON  CALL IV  Last 

administered on 3/12/18at 07:40;  Start 3/12/18 at 06:45;  Stop 3/13/18 at 06:44

;  Status DC


Ropivacaine 24.63 ml/Ketorolac Tromethamine 30 mg/Epinephrine HCl 0.5 mg/ 

Clonidine 80 mcg/ Sodium Chloride 100 ml @  200 mls/hr ONCE P-ARTICULR  Last 

administered on 3/12/18at 10:42;  Start 3/12/18 at 08:30;  Stop 3/12/18 at 14:30

;  Status DC


Tranexamic Acid 3000 mg/Sodium Chloride 130 ml @  260 mls/hr ONCE P-ARTICULR  

Last administered on 3/12/18at 10:34;  Start 3/12/18 at 08:30;  Stop 3/12/18 at 

14:30;  Status DC


Dexamethasone Sodium Phosphate (Decadron Inj) 10 mg ON  CALL IV  Last 

administered on 3/12/18at 07:09;  Start 3/12/18 at 06:45;  Stop 3/13/18 at 06:44

;  Status DC


Amlodipine Besylate (Norvasc) 10 mg DAILY PO  Last administered on 3/17/18at 08:

21;  Start 3/12/18 at 09:00


Cholecalciferol (Vitamin D3) 1,000 units DAILY PO  Last administered on 3/17/

18at 08:22;  Start 3/13/18 at 09:00


Fluoxetine HCl (PROzac) 20 mg BID PO  Last administered on 3/17/18at 08:22;  

Start 3/12/18 at 09:00


Furosemide (Lasix) 80 mg DAILY PO  Last administered on 3/16/18at 08:08;  Start 

3/12/18 at 09:00


Gabapentin (Neurontin) 300 mg HS PO  Last administered on 3/16/18at 21:28;  

Start 3/12/18 at 21:00


Insulin Detemir (Levemir Inj) 55 units HS SQ  Last administered on 3/15/18at 21:

19;  Start 3/12/18 at 21:00;  Stop 3/16/18 at 17:12;  Status DC


Insulin Detemir (Levemir Inj) 75 units AC  BREAKFAST SQ  Last administered on 3/

13/18at 06:45;  Start 3/12/18 at 07:15;  Stop 3/13/18 at 23:08;  Status DC


Levothyroxine Sodium (Synthroid) 25 mcg DAILY@0600 PO  Last administered on 3/17

/18at 06:23;  Start 3/13/18 at 06:00


Lisinopril (Prinivil) 10 mg DAILY PO  Last administered on 3/17/18at 08:21;  

Start 3/12/18 at 09:00


Metoprolol Succinate (Toprol Xl) 25 mg DAILY PO  Last administered on 3/17/18at 

08:22;  Start 3/12/18 at 09:00


Pantoprazole Sodium (Protonix) 40 mg DAILY PO  Last administered on 3/17/18at 08

:22;  Start 3/13/18 at 09:00


Non-Formulary Medication 1 drop Q12HR RIGHT EYE ;  Start 3/12/18 at 09:00;  

Status UNV


Sodium Chloride 1,000 ml @  100 mls/hr Q10H IV  Last administered on 3/13/18at 

05:18;  Start 3/12/18 at 10:00;  Stop 3/13/18 at 23:26;  Status DC


Cefazolin Sodium/ Dextrose 50 ml @  100 mls/hr Q6H IV  Last administered on 3/13

/18at 00:59;  Start 3/12/18 at 12:00;  Stop 3/13/18 at 00:29;  Status DC


Miscellaneous Information (Post-op Orders (for Pharmacy))  STAT  ONCE XX ;  

Start 3/12/18 at 07:00;  Stop 3/12/18 at 12:18;  Status DC


Enoxaparin Sodium (Lovenox Inj) 40 mg Q24H SQ  Last administered on 3/13/18at 10

:22;  Start 3/13/18 at 10:00;  Stop 3/14/18 at 12:39;  Status DC


Morphine Sulfate (Morphine Inj) 3 mg Q3H  PRN IV PUSH Pain >7 when off PCA Last 

administered on 3/14/18at 07:38;  Start 3/12/18 at 07:00


Acetaminophen/ Hydrocodone Bitart (Norco  7.5-325 Mg) 1 tab Q4H  PRN PO PAIN 

LESS THAN 5 ON SCALE Last administered on 3/16/18at 16:00;  Start 3/12/18 at 07:

00


Acetaminophen/ Hydrocodone Bitart (Norco  7.5-325 Mg) 2 tab Q4H  PRN PO PAIN 

SCALE 5 TO 10 Last administered on 3/17/18at 04:15;  Start 3/12/18 at 07:00


Multivitamins/ Minerals Therapeutic (Theragran M Tab) 1 tab BID PO  Last 

administered on 3/17/18at 08:22;  Start 3/13/18 at 21:00;  Stop 5/12/18 at 20:59


Ondansetron HCl (Zofran Inj) 4 mg Q6H  PRN IVP NAUSEA OR VOMITING Last 

administered on 3/14/18at 22:48;  Start 3/12/18 at 07:00


Docusate Sodium (Colace) 100 mg BID PO  Last administered on 3/14/18at 08:20;  

Start 3/13/18 at 21:00;  Stop 3/14/18 at 09:04;  Status DC


Zolpidem Tartrate (Ambien) 5 mg HS  PRN PO SLEEP;  Start 3/12/18 at 07:00


Bisacodyl (Dulcolax Supp) 10 mg DAILY  PRN RECTAL CONSTIPATION;  Start 3/12/18 

at 07:00


Diphenhydramine HCl (Benadryl Inj) 25 mg Q6H  PRN IV PUSH ITCHING;  Start 3/12/

18 at 07:00


Vancomycin/Sodium Chloride 200 ml @  As Directed STK-MED ONCE IV ;  Start 3/12/

18 at 07:11;  Stop 3/12/18 at 07:12;  Status DC


Brimonidine Tartrate (Alphagan 0.2% Opth Soln) 1 drop Q12HR RIGHT EYE  Last 

administered on 3/16/18at 21:28;  Start 3/12/18 at 09:00


Timolol Maleate (Timoptic 0.5% Opth Soln) 1 drop Q12HR RIGHT EYE  Last 

administered on 3/16/18at 21:28;  Start 3/12/18 at 09:00


Gentamicin Sulfate (Gentamicin Inj) 240 mg STK-MED ONCE .ROUTE  Last 

administered on 3/12/18at 09:11;  Start 3/12/18 at 07:21;  Stop 3/12/18 at 07:22

;  Status DC


Fentanyl Citrate (fentaNYL INJ) 100 mcg STK-MED ONCE .ROUTE ;  Start 3/12/18 at 

07:38;  Stop 3/12/18 at 07:39;  Status DC


Midazolam HCl (Versed Inj) 4 mg STK-MED ONCE .ROUTE ;  Start 3/12/18 at 07:38;  

Stop 3/12/18 at 07:39;  Status DC


Bupivacaine Liposome (Exparel Pf 1.3% Inj) 20 ml STK-MED ONCE .ROUTE ;  Start 3/

12/18 at 07:38;  Stop 3/12/18 at 07:39;  Status DC


Albuterol Sulfate (*ALBUTEROL NEB PERIprocedure ONLY) 2.5 mg STK-MED ONCE NEB  

Last administered on 3/12/18at 10:46;  Start 3/12/18 at 10:46;  Stop 3/12/18 at 

10:47;  Status DC


Fentanyl Citrate (fentaNYL INJ) 200 mcg STK-MED ONCE .ROUTE ;  Start 3/12/18 at 

10:52;  Stop 3/12/18 at 10:53;  Status DC


Meperidine HCl (*DEMEROL INJ PERIprocedural ONLY) 25 mg STK-MED ONCE .ROUTE  

Last administered on 3/12/18at 11:38;  Start 3/12/18 at 11:38;  Stop 3/12/18 at 

11:39;  Status DC


Miscellaneous Information ALL NURSING DEPARTME... UNSCH  PRN .XX SEE LABEL 

COMMENTS;  Start 3/12/18 at 12:15;  Stop 3/13/18 at 12:14;  Status DC


Morphine Sulfate (*morphine INJ PERIprocedure ONLY) 4 mg STK-MED ONCE .ROUTE  

Last administered on 3/12/18at 12:59;  Start 3/12/18 at 12:59;  Stop 3/12/18 at 

13:00;  Status DC


Dextrose (D50w (Vial) Inj) 50 ml UNSCH  PRN IV PUSH HYPOGLYCEMIA-SEE COMMENTS;  

Start 3/12/18 at 15:30


Glucagon (Glucagon Inj) 1 mg UNSCH  PRN OTHER HYPOGLYCEMIA-SEE COMMENTS;  Start 

3/12/18 at 15:30


Insulin Aspart (NovoLOG SUPPLEMENTAL SCALE) 1 ACHS SLIDING  SCALE SQ  Last 

administered on 3/16/18at 12:00;  Start 3/12/18 at 17:00


Insulin Detemir (Levemir Inj) 75 units DAILY@0800 SQ  Last administered on 3/17/

18at 12:30;  Start 3/14/18 at 08:00


Magnesium Hydroxide (Milk Of Magnesia Liq) 30 ml BID  PRN PO MILD CONSTIPATION 

Last administered on 3/14/18at 21:04;  Start 3/13/18 at 23:30


Lactated Ringer's 1,000 ml @  As Directed STK-MED ONCE IV ;  Start 3/12/18 at 12

:00;  Stop 3/14/18 at 07:50;  Status DC


Lidocaine HCl (Xylocaine-Mpf 1% Inj) 5 ml STK-MED ONCE OTHER ;  Start 3/12/18 

at 12:00;  Stop 3/14/18 at 07:50;  Status DC


Rocuronium Bromide (Zemuron Inj) 50 mg STK-MED ONCE IV PUSH ;  Start 3/12/18 at 

12:00;  Stop 3/14/18 at 07:50;  Status DC


Neostigmine Methylsulfate (Prostigmine Inj) 5 mg STK-MED ONCE IV PUSH ;  Start 3

/12/18 at 12:00;  Stop 3/14/18 at 07:50;  Status DC


Glycopyrrolate (Robinul Inj) 1 mg STK-MED ONCE IV PUSH ;  Start 3/12/18 at 12:00

;  Stop 3/14/18 at 07:50;  Status DC


Phenylephrine HCl (Neosynephrine/ NS 1000 Mcg/10ml Syr) 1,000 mcg STK-MED ONCE 

IV ;  Start 3/12/18 at 12:00;  Stop 3/14/18 at 07:50;  Status DC


Ondansetron HCl (Zofran Inj) 4 mg STK-MED ONCE IV ;  Start 3/12/18 at 12:00;  

Stop 3/14/18 at 07:50;  Status DC


Propofol (Diprivan  200 Mg/20 ml Inj) 200 mg STK-MED ONCE IV ;  Start 3/12/18 

at 12:00;  Stop 3/14/18 at 07:50;  Status DC


Benzocaine/Menthol (Cepacol Extra Delicia (Sugar Free)) 1 lozenge Q2HR  PRN BUCCAL 

sore throat;  Start 3/14/18 at 09:00


Senna/Docusate Sodium (Alberta-Colace) 2 tab BID PO  Last administered on 3/17/

18at 08:22;  Start 3/14/18 at 21:00


Aspirin (Aspirin Chew) 81 mg DAILY PO  Last administered on 3/17/18at 08:21;  

Start 3/14/18 at 13:00


Lactulose (Lactulose Liq) 30 ml DAILY PO ;  Start 3/15/18 at 09:00;  Stop 3/15/

18 at 09:00;  Status DC


Lactulose (Lactulose Liq) 30 ml DAILY  PRN PO MODERATE CONSTIPATION Last 

administered on 3/14/18at 18:56;  Start 3/14/18 at 18:30


Furosemide (Lasix Inj) 20 mg ONCE  ONCE IV PUSH  Last administered on 3/15/18at 

12:43;  Start 3/15/18 at 11:30;  Stop 3/15/18 at 11:48;  Status DC


Piperacillin Sod/ Tazobactam Sod 100 ml @  200 mls/hr Q6H IV  Last administered 

on 3/16/18at 17:00;  Start 3/15/18 at 12:00;  Stop 3/16/18 at 17:09;  Status DC


Vancomycin HCl 1000 mg/Sodium Chloride 250 ml @  250 mls/hr ONCE  ONCE IV ;  

Start 3/15/18 at 13:00;  Stop 3/15/18 at 13:59;  Status Cancel


Pharmacy Profile Note 0 ml @ 0 mls/hr UNSCH OTHER ;  Start 3/15/18 at 11:30;  

Stop 3/16/18 at 17:09;  Status DC


Vancomycin HCl 2000 mg/Sodium Chloride 520 ml @  250 mls/hr Q18H IV  Last 

administered on 3/16/18at 08:00;  Start 3/15/18 at 14:00;  Stop 3/16/18 at 17:09

;  Status DC


Miscellaneous Information SPECIFIC LAB TO BE DRAWN:VANCOMYCIN TROUGH DATE TO... 

ONCE  ONCE .XX ;  Start 3/17/18 at 19:45;  Stop 3/17/18 at 19:45;  Status DC


Methylprednisolone Sodium Succinate (SoluMEDROL INJ) 125 mg ONCE  ONCE IV PUSH  

Last administered on 3/15/18at 20:21;  Start 3/15/18 at 15:45;  Stop 3/15/18 at 

15:46;  Status DC


Alprazolam (Xanax) 0.25 mg Q6H  PRN PO anxiety Last administered on 3/15/18at 21

:20;  Start 3/15/18 at 15:45


Sodium Chloride 1,000 ml @  50 mls/hr Q20H IV  Last administered on 3/16/18at 14

:15;  Start 3/16/18 at 14:15


Levofloxacin (Levaquin) 750 mg DAILY PO  Last administered on 3/17/18at 08:22;  

Start 3/17/18 at 09:00


Insulin Detemir (Levemir Inj) 60 units HS SQ  Last administered on 3/16/18at 21:

27;  Start 3/16/18 at 21:00


Iohexol (Omnipaque 350 Inj) 75 ml STK-MED ONCE IVCONTRAST  Last administered on 

3/16/18at 18:14;  Start 3/16/18 at 18:03;  Stop 3/16/18 at 18:07;  Status DC





A/P


Problem List:  


(1) Chronic diastolic congestive heart failure


ICD Code:  I50.32 - Chronic diastolic (congestive) heart failure


(2) Primary localized osteoarthrosis, lower leg


ICD Code:  M17.10 - Unilateral primary osteoarthritis, unspecified knee


(3) DM (diabetes mellitus)


ICD Code:  E11.9 - Type 2 diabetes mellitus without complications


Status:  Acute


(4) Hypertension


ICD Code:  I10 - Essential (primary) hypertension


Status:  Acute


Assessment and Plan


57-year-old female with history of insulin dependent diabetes, CKD stage III, 

HTN, diastolic CHF, CAD, hypothyroidism, depression, osteoarthritis, admitted 

to the hospital for right total knee arthroplasty.  Hospitalist consultation 

was requested for medical management.  





Osteoarthritis: Status post right total knee arthroplasty.  


   -Management per orthopedic surgery.  s/p fall this am after tripping over 

cord.  Patient bleeding from incision site and Lovenox injection site.  Lovenox 

discontinued and transitioned to aspirin 81 mg daily


   -Continue pain control, bowel regimen, physical therapy.





Chronic diastolic congestive heart failure, mild acute exacerbation


   -Continue Lasix, lisinopril, metoprolol.


   -Chest x-ray showing some mild pulmonary vascular congestion.  Given 

additional dose of IV Lasix.  Monitor intake and output.


   -BNP 61


   -Monitor for improvement.  Continue oxygen.  


   -home oxygen walk test ordered





Hospital-acquired pneumonia with apparent inciting dyspnea, tachycardia and 

hypoxia


   Patient requiring increase in O2 dependence, now at 4 L nasal cannula.  

Chest x-ray showing mild perihilar infiltrates consistent with pulmonary 

vascular congestion or pneumonia.  Given gentle IV diuresis.  Also started on 

IV Zosyn


 and Vancomycin.  Discontinue, change to po Levaquin. 


   Patient is off oxygen


Wants to go home


   sputum culture shows heavy growth of normal respiratory rafia


   Lactic acid 1.2.  


   D-dimer elevated.  Obtain CTA to r/o PE.  Gentle IVF given impaired kidney 

function and BNP 61.





Insulin-dependent diabetes mellitus: Glucose has been elevated.  


   - this am.  Increase night time Levemir to 60u and continue Levemir 

75u in am.   


   -Diabetic diet.  


   -Monitor Accu-Cheks and cover with sliding scale insulin.





Hypertension: Chronic, BP well controlled. 


   -Continue patient's metoprolol, amlodipine, lisinopril.  


   -Monitor BP, adjust antihypertensives as needed





Chronic kidney disease stage III: chronic, stable


   -avoid nephrotoxins


   -Monitor BUN and creatinine.





Hypothyroidism: chronic


   -Continue Synthroid.





Depression: Chronic


   -Continue fluoxetine.





Constipation: suspect secondary to opiates.


   -(+)BM


   -Continue alberta-Colace 2 tabs po bid


   -Constipation protocol meds prn


   -Monitor for BM





DVT prophylaxis: Lovenox discontinued by Ortho, changed to ASA 81mg daily





We will discharge to home today has been cleared by orthopedics and [facility] home 

health care


Discharge Planning


Discharged home today





Problem Qualifiers





(1) Primary localized osteoarthrosis, lower leg:  


Qualified Codes:  M17.11 - Unilateral primary osteoarthritis, right knee








Valentino Berger DO Mar 17, 2018 14:11

## 2018-03-17 NOTE — PD.[email or web address].PN
Subjective


Subjective Remarks


Appears depressed.


Doing okay.


Appreciate medical notes





Objective


Vitals





Vital Signs








  Date Time  Temp Pulse Resp B/P (MAP) Pulse Ox O2 Delivery O2 Flow Rate FiO2


 


3/17/18 04:17 97.2 82 19 133/63 (86) 98   


 


3/17/18 03:55  72      


 


3/16/18 23:30 98.1 84 18 138/76 (96) 96   


 


3/16/18 21:32      Nasal Cannula 4.00 


 


3/16/18 20:11     99 Nasal Cannula 4.00 


 


3/16/18 20:09 98.5 92 19 137/67 (90) 98   


 


3/16/18 20:00  105      


 


3/16/18 16:00 98.4 94 18 135/79 (97) 100   


 


3/16/18 12:00 97.7 76 18 111/69 (83) 97   


 


3/16/18 10:32     100 Nasal Cannula 4.00 














I/O      


 


 3/16/18 3/16/18 3/16/18 3/17/18 3/17/18 3/17/18





 07:00 15:00 23:00 07:00 15:00 23:00


 


Intake Total 360 ml  360 ml 360 ml  


 


Balance 360 ml  360 ml 360 ml  


 


      


 


Intake Oral 360 ml  360 ml 360 ml  


 


# Voids 3  3 2  


 


# Bowel Movements 1  0 0  








Result Diagram:  


3/16/18 0618                                                                   

             3/16/18 0618





Imaging





Last 24 hours Impressions








Pelvis X-Ray 3/15/18 0000 Signed





Impressions: 





 Service Date/Time:  Thursday, March 15, 2018 04:53 - CONCLUSION: No acute 





 disease.       Griffin De Leon Jr., MD 


 


Lumbar Spine X-Ray 3/15/18 0000 Signed





Impressions: 





 Service Date/Time:  Thursday, March 15, 2018 04:45 - CONCLUSION: No acute 





 disease.       Griffin De Leon Jr., MD 


 


Knee X-Ray 3/15/18 0000 Signed





Impressions: 





 Service Date/Time:  Thursday, March 15, 2018 04:57 - CONCLUSION:  Total knee 





 prosthesis in good position. Small residual joint effusion.     Griffin De Leon Jr., MD 


 


Knee X-Ray 3/15/18 0000 Signed





Impressions: 





 Service Date/Time:  Thursday, March 15, 2018 04:55 - CONCLUSION:  Advanced 





 tricompartmental osteoarthritis. No acute abnormality.     Griffin De Leon Jr., MD 








Objective Remarks


Lying in bed.


Dressing right knee dry.  Mild swelling.  Mild to moderate ecchymosis.


No calf tenderness.  Mild swelling.


Negative Homans sign.


Sensation distally normal





Assessment & Plan


Problem List:  


(1) Primary localized osteoarthrosis, lower leg


ICD Codes:  M17.10 - Unilateral primary osteoarthritis, unspecified knee


Qualifiers:  


   Qualified Codes:  M17.11 - Unilateral primary osteoarthritis, right knee


(2) Hypertension


ICD Codes:  I10 - Essential (primary) hypertension


Status:  Acute


(3) DM (diabetes mellitus)


ICD Codes:  E11.9 - Type 2 diabetes mellitus without complications


Status:  Acute


(4) Chronic diastolic congestive heart failure


ICD Codes:  I50.32 - Chronic diastolic (congestive) heart failure


Assessment and Plan


Right TKA: POD #5





wbat


daily dressing changes


Fell tripping over SCD chord - xrays negative


Lovenox DC'd, aspirin 81 mg twice a day


discussed d/c home vs snf with patient in detail.  declines snf.  states she 

feels she will be safe at home.


Plan discharge to home today, if medically clear


rx in chart


f/up dr. tse 2 weeks


Orthopedically stable











Wilberto Baeza MD Mar 17, 2018 09:12

## 2018-03-19 ENCOUNTER — HOSPITAL ENCOUNTER (EMERGENCY)
Dept: HOSPITAL 17 - NEPC | Age: 58
Discharge: HOME | End: 2018-03-19
Payer: COMMERCIAL

## 2018-03-19 VITALS — BODY MASS INDEX: 44.98 KG/M2 | HEIGHT: 67 IN | WEIGHT: 286.6 LBS

## 2018-03-19 VITALS
HEART RATE: 71 BPM | OXYGEN SATURATION: 96 % | RESPIRATION RATE: 20 BRPM | DIASTOLIC BLOOD PRESSURE: 92 MMHG | TEMPERATURE: 98.6 F | SYSTOLIC BLOOD PRESSURE: 165 MMHG

## 2018-03-19 DIAGNOSIS — E07.9: ICD-10-CM

## 2018-03-19 DIAGNOSIS — R60.0: Primary | ICD-10-CM

## 2018-03-19 DIAGNOSIS — I11.0: ICD-10-CM

## 2018-03-19 DIAGNOSIS — Z96.651: ICD-10-CM

## 2018-03-19 DIAGNOSIS — E10.9: ICD-10-CM

## 2018-03-19 DIAGNOSIS — R94.31: ICD-10-CM

## 2018-03-19 DIAGNOSIS — K21.9: ICD-10-CM

## 2018-03-19 DIAGNOSIS — Z86.73: ICD-10-CM

## 2018-03-19 DIAGNOSIS — I50.9: ICD-10-CM

## 2018-03-19 LAB
BASOPHILS # BLD AUTO: 0 TH/MM3 (ref 0–0.2)
BASOPHILS NFR BLD: 0.2 % (ref 0–2)
BUN SERPL-MCNC: 12 MG/DL (ref 7–18)
CALCIUM SERPL-MCNC: 9.3 MG/DL (ref 8.5–10.1)
CHLORIDE SERPL-SCNC: 96 MEQ/L (ref 98–107)
CREAT SERPL-MCNC: 1.09 MG/DL (ref 0.5–1)
EOSINOPHIL # BLD: 0.3 TH/MM3 (ref 0–0.4)
EOSINOPHIL NFR BLD: 3.9 % (ref 0–4)
ERYTHROCYTE [DISTWIDTH] IN BLOOD BY AUTOMATED COUNT: 14.6 % (ref 11.6–17.2)
GFR SERPLBLD BASED ON 1.73 SQ M-ARVRAT: 62 ML/MIN (ref 89–?)
GLUCOSE SERPL-MCNC: 175 MG/DL (ref 74–106)
HCO3 BLD-SCNC: 35.2 MEQ/L (ref 21–32)
HCT VFR BLD CALC: 34.4 % (ref 35–46)
HGB BLD-MCNC: 11.5 GM/DL (ref 11.6–15.3)
INR PPP: 1 RATIO
LYMPHOCYTES # BLD AUTO: 2.2 TH/MM3 (ref 1–4.8)
LYMPHOCYTES NFR BLD AUTO: 26.1 % (ref 9–44)
MCH RBC QN AUTO: 29.8 PG (ref 27–34)
MCHC RBC AUTO-ENTMCNC: 33.4 % (ref 32–36)
MCV RBC AUTO: 89 FL (ref 80–100)
MONOCYTE #: 0.6 TH/MM3 (ref 0–0.9)
MONOCYTES NFR BLD: 7.5 % (ref 0–8)
NEUTROPHILS # BLD AUTO: 5.4 TH/MM3 (ref 1.8–7.7)
NEUTROPHILS NFR BLD AUTO: 62.3 % (ref 16–70)
PLATELET # BLD: 334 TH/MM3 (ref 150–450)
PMV BLD AUTO: 8.4 FL (ref 7–11)
PROTHROMBIN TIME: 10.3 SEC (ref 9.8–11.6)
RBC # BLD AUTO: 3.86 MIL/MM3 (ref 4–5.3)
SODIUM SERPL-SCNC: 137 MEQ/L (ref 136–145)
TROPONIN I SERPL-MCNC: (no result) NG/ML (ref 0.02–0.05)
WBC # BLD AUTO: 8.6 TH/MM3 (ref 4–11)

## 2018-03-19 PROCEDURE — 85025 COMPLETE CBC W/AUTO DIFF WBC: CPT

## 2018-03-19 PROCEDURE — 84484 ASSAY OF TROPONIN QUANT: CPT

## 2018-03-19 PROCEDURE — 85610 PROTHROMBIN TIME: CPT

## 2018-03-19 PROCEDURE — 93970 EXTREMITY STUDY: CPT

## 2018-03-19 PROCEDURE — 80048 BASIC METABOLIC PNL TOTAL CA: CPT

## 2018-03-19 PROCEDURE — 93005 ELECTROCARDIOGRAM TRACING: CPT

## 2018-03-19 PROCEDURE — 83880 ASSAY OF NATRIURETIC PEPTIDE: CPT

## 2018-03-19 PROCEDURE — 71045 X-RAY EXAM CHEST 1 VIEW: CPT

## 2018-03-19 NOTE — PD
HPI


Chief Complaint:  Edema


Time Seen by Provider:  04:24


Travel History


International Travel<30 days:  No


Contact w/Intl Traveler<30days:  No


Traveled to known affect area:  No





History of Present Illness


HPI


57-year-old female came to the emergency room with history of bilateral lower 

extremity edema.  Patient says this is been going on for almost 1 week.  

Patient had a right knee replacement on 1 week ago.  Her leg swelling started 

then.  They had done an ultrasound before discharging her home which was 

negative.  Patient is also complaining of shortness of breath worse when she 

tries to lay flat.  She does have history of congestive heart failure and she 

is taking 40 mg of Lasix by mouth which has not changed in dose.  No history of 

chest pain.  Vital signs are relatively stable.  Patient is not on any blood 

thinners.  She does not have history of DVT or PEs in the past.





PFSH


Past Medical History


*** Narrative Medical


List of her past medical, surgical, social and family history reviewed from the 

nursing note


Hx Anticoagulant Therapy:  No


Heart Rhythm Problems:  No


Cancer:  No


Cardiac Catheterization:  Yes (3)


Cardiovascular Problems:  Yes


High Cholesterol:  No


Congestive Heart Failure:  Yes


Cerebrovascular Accident:  Yes (TIA)


Diabetes:  Yes (TYPE I)


Patient Takes Glucophage:  No


Diminished Hearing:  No


Endocrine:  Yes


Gastrointestinal Disorders:  Yes (REFLUX)


Hepatitis:  No


Hypertension:  Yes


Neurologic:  No


Reproductive:  No


Respiratory:  No


Immunizations Current:  No


Pancreatitis:  Yes


Thyroid Disease:  Yes


Tetanus Vaccination:  < 5 Years


Influenza Vaccination:  Yes


Pregnant?:  Not Pregnant


Menopausal:  Yes


:  3


Para:  2


:  1





Past Surgical History


Cardiac Surgery:  Yes (HEART CATH)


Cholecystectomy:  Yes


Coronary Artery Bypass Graft:  No


Eye Surgery:  Yes (RIGHT EYE, LEFT LASIK)


Gynecologic Surgery:  Yes (, HYSTERECTOMY)


Hysterectomy:  Yes


Joint Replacement:  No


Other Surgery:  Yes





Family History


Family Myocardial Infarction:  Yes (SISTER)





Social History


Alcohol Use:  No


Tobacco Use:  No


Substance Use:  No





Allergies-Medications


(Allergen,Severity, Reaction):  


Coded Allergies:  


     pregabalin (Verified  Allergy, Unknown, DIZZINESS, SLUGGISH, 3/19/18)


Comments


List of her allergies reviewed from the nursing note.


Reported Meds & Prescriptions





Reported Meds & Active Scripts


Active


Mucinex DM (Dextromethorphan-Guaifenesin)  Mg Tab 1 Tab PO BID PRN


Gnp Senna Plus 8.6-50 mg (Sennosides-Docusate Sodium) 8.6 Mg-50 Mg Tab 2 Tab PO 

BID


Levaquin (Levofloxacin) 750 Mg Tablet 750 Mg PO DAILY


Walker with Front Wheels (Device) 1 Mis Mis Ea .XX AS DIRECTED


Aspirin Low Dose (Aspirin) 81 Mg Chew 81 Mg CHEW BID 30 Days


Norco (Hydrocodone-Acetaminophen) 7.5-325 mg Tab 1-2 Tab PO Q6H PRN


Reported


Combigan Opth Drops (Brimonidine-Timolol Opth Drops) 0.2-0.5% Soln 1 Drop RIGHT 

EYE Q12HR


Amlodipine (Amlodipine Besylate) 10 Mg Tab 10 Mg PO DAILY


Levothyroxine (Levothyroxine Sodium) 25 Mcg Tab 25 Mcg PO DAILY


Vitamin D3 (Cholecalciferol) 1,000 Unit Tab 1,000 Units PO DAILY


Furosemide 40 Mg Tab 80 Mg PO DAILY


Lisinopril 10 Mg Tab 10 Mg PO DAILY


Metoprolol Succinate ER 24 HR (Metoprolol Succinate) 25 Mg Tab 25 Mg PO DAILY


Fluoxetine (Fluoxetine HCl) 40 Mg Cap 20 Cap PO BID


Protonix (Pantoprazole Sodium) 40 Mg Tab 40 Mg PO DAILY


Gabapentin 300 Mg Cap 300 Mg PO HS


Lantus Inj (Insulin Glargine) 1,000 Unit/10 Ml Vial 55 Units SQ HS


Lantus Inj (Insulin Glargine) 1,000 Unit/10 Ml Vial 75 Units SQ AC BREAKFAST





Narrative Medication


List of her home medications reviewed from the nursing note.





Review of Systems


Except as stated in HPI:  all other systems reviewed are Neg


Respiratory:  Positive: Shortness of Breath


Musculoskeletal:  Positive: Edema





Physical Exam


Narrative


GENERAL: Awake, alert, morbidly obese, no obvious


SKIN: Focused skin assessment warm/dry.


HEAD: Atraumatic. Normocephalic. 


EYES: Pupils equal and round. No scleral icterus. No injection or drainage. 


ENT: No nasal bleeding or discharge.  Mucous membranes pink and moist.


NECK: Trachea midline. No JVD. 


CARDIOVASCULAR: Regular rate and rhythm.  No murmur appreciated.


RESPIRATORY: No accessory muscle use. Clear to auscultation. Breath sounds 

equal bilaterally. 


GASTROINTESTINAL: Abdomen soft, non-tender, nondistended. Hepatic and splenic 

margins not palpable. 


MUSCULOSKELETAL: No obvious deformities. No clubbing.  No cyanosis.  Bilateral 3

+ pitting edema. 


NEUROLOGICAL: Awake and alert. No obvious cranial nerve deficits.  Motor 

grossly within normal limits. Normal speech.


PSYCHIATRIC: Appropriate mood and affect; insight and judgment normal.





Data


Data


Last Documented VS





Orders





 Orders


Complete Blood Count With Diff (3/19/18 04:25)


Basic Metabolic Panel (Bmp) (3/19/18 04:25)


Prothrombin Time / Inr (Pt) (3/19/18 04:25)


B-Type Natriuretic Peptide (3/19/18 04:25)


Electrocardiogram (3/19/18 )


Troponin I (3/19/18 04:25)


Us Leg Venous Doppler Bilat (3/19/18 )


Chest, Single Ap (3/19/18 )


Potassium Chloride (Kcl) (3/19/18 05:45)


Ed Discharge Order (3/19/18 07:03)


Acetamin-Hydrocod 325-5 Mg (Norco  5-325 (3/19/18 07:15)





Labs





Laboratory Tests








Test


  3/19/18


04:37


 


White Blood Count 8.6 TH/MM3 


 


Red Blood Count 3.86 MIL/MM3 


 


Hemoglobin 11.5 GM/DL 


 


Hematocrit 34.4 % 


 


Mean Corpuscular Volume 89.0 FL 


 


Mean Corpuscular Hemoglobin 29.8 PG 


 


Mean Corpuscular Hemoglobin


Concent 33.4 % 


 


 


Red Cell Distribution Width 14.6 % 


 


Platelet Count 334 TH/MM3 


 


Mean Platelet Volume 8.4 FL 


 


Neutrophils (%) (Auto) 62.3 % 


 


Lymphocytes (%) (Auto) 26.1 % 


 


Monocytes (%) (Auto) 7.5 % 


 


Eosinophils (%) (Auto) 3.9 % 


 


Basophils (%) (Auto) 0.2 % 


 


Neutrophils # (Auto) 5.4 TH/MM3 


 


Lymphocytes # (Auto) 2.2 TH/MM3 


 


Monocytes # (Auto) 0.6 TH/MM3 


 


Eosinophils # (Auto) 0.3 TH/MM3 


 


Basophils # (Auto) 0.0 TH/MM3 


 


CBC Comment DIFF FINAL 


 


Differential Comment  


 


Prothrombin Time 10.3 SEC 


 


Prothromb Time International


Ratio 1.0 RATIO 


 


 


Blood Urea Nitrogen 12 MG/DL 


 


Creatinine 1.09 MG/DL 


 


Random Glucose 175 MG/DL 


 


Calcium Level 9.3 MG/DL 


 


Sodium Level 137 MEQ/L 


 


Potassium Level 3.4 MEQ/L 


 


Chloride Level 96 MEQ/L 


 


Carbon Dioxide Level 35.2 MEQ/L 


 


Anion Gap 6 MEQ/L 


 


Estimat Glomerular Filtration


Rate 62 ML/MIN 


 


 


Troponin I


  LESS THAN 0.02


NG/ML


 


B-Type Natriuretic Peptide 6 PG/ML 











MDM


Medical Decision Making


Medical Screen Exam Complete:  Yes


Emergency Medical Condition:  Yes


Medical Record Reviewed:  Yes


Interpretation(s)


Twelve-lead EKG was reviewed by me.  Normal sinus rhythm, left axis deviation, 

nonspecific ST-T wave changes, poor R-wave progression.  Heart rate of 69 bpm.


Differential Diagnosis


CHF, DVT, dependent


Narrative Course


5:52 AM blood test results are back and it is within normal limit including the 

BNP and troponin.  Chest x-ray is negative.  Awaiting for an ultrasound of her 

legs to be done and resulted.  Potassium was slightly low probably from 

constant diuresis.  I have ordered p.o. potassium replacement.





6:32 AM ultrasound is still pending to be resulted.  Case most probably will be 

signed over to the oncoming ER physician.





7:04 AM ultrasound is negative for DVT.  I will discharge her home.





Procedures


EKG Prior to Arrival:  No





Diagnosis





 Primary Impression:  


 Dependent edema


Referrals:  


Primary Care Physician


2 days





***Additional Instructions:  


Keep the legs elevated to keep the swelling down.  Follow-up with your primary 

care.


***Med/Other Pt SpecificInfo:  No Change to Meds


Disposition:  01 DISCHARGE HOME


Condition:  Stable











Richelle Mccoy MD Mar 19, 2018 04:25

## 2018-03-19 NOTE — RADRPT
EXAM DATE/TIME:  2018 05:58 

 

[facility] COMPARISON:     

No previous studies available for comparison.

        

 

 

INDICATIONS :                

Bilateral leg swelling. 

            

 

MEDICAL HISTORY :     

Myocardial infarction. Congestive heart failure. Pancreatitis. Thyroid disease. TIA. Syncope. HTN. Ar
thritis. Type I Diabetes. DVT. Anticoagulant therapy, Aspirin 81mg.

 

SURGICAL HISTORY :     

Cholecystectomy. section. Hysterectomy.Right eye surgery. Left eye lasik. Cardiac cath. Right
 knee replacement. 

 

ENCOUNTER:     

Subsequent

 

ACUITY:     

3 days

 

PAIN SCORE:      

10/10

 

LOCATION:      

Bilateral  leg.

                       

 

TECHNIQUE:     

Venous ultrasound of the left and right leg was performed from the inguinal ligament to the proximal 
calf.  Real-time, color Doppler and spectral tracing, compression and augmentation techniques were us
ed.  

 

FINDINGS:     

 

RIGHT LEG:     

There is normal compressibility of the deep venous system from the inguinal region to the proximal ca
lf.  No echogenic clot is seen in the lumen of the common femoral, femoral, popliteal, and posterior 
tibial veins.  There is a normal response of the venous system to proximal and distal augmentation an
d respiration.  

 

LEFT LEG:     

There is normal compressibility of the deep venous system from the inguinal region to the proximal ca
lf.  No echogenic clot is seen in the lumen of the common femoral, femoral, popliteal, and posterior 
tibial veins.  There is a normal response of the venous system to proximal and distal augmentation an
d respiration.  Popliteal cyst measuring 3.2 x 5.6 cm.  

 

CONCLUSION:     

Negative for deep venous thrombosis bilateral lower extremities.

 

 

 

 Griffin Cook MD on 2018 at 6:56           

Board Certified Radiologist.

 This report was verified electronically.

## 2018-03-19 NOTE — RADRPT
EXAM DATE/TIME:  03/19/2018 04:39 

 

[facility] COMPARISON:     

CHEST SINGLE AP, March 15, 2018, 10:02.

 

                     

INDICATIONS :     

Short of breath, wheezing, leg swelling.

                     

 

MEDICAL HISTORY :     

Chronic obstructive pulmonary disease.          

 

SURGICAL HISTORY :     

None.   

 

ENCOUNTER:     

Initial                                        

 

ACUITY:     

1 day      

 

PAIN SCORE:     

0/10

 

LOCATION:     

Bilateral chest 

 

FINDINGS:     

A single view of the chest demonstrates the lungs to be symmetrically aerated without evidence of mas
s, infiltrate or effusion.  The cardiomediastinal contours are unremarkable.  Osseous structures are 
intact.

 

CONCLUSION:     The lungs are clear.

 

 

 

 Griffin Cook MD on March 19, 2018 at 5:00           

Board Certified Radiologist.

 This report was verified electronically.

## 2018-03-19 NOTE — EKG
Date Performed: 03/19/2018       Time Performed: 05:05:13

 

PTAGE:      57 years

 

EKG:      Sinus rhythm 

 

 BORDERLINE LEFT AXIS DEVIATION MINIMAL VOLTAGE CRITERIA FOR LVH, CONSIDER NORMAL VARIANT NONSPECIFIC
 T-WAVE ABNORMALITY BORDERLINE ECG

 

PREVIOUS TRACING       : 08/26/2017 08.03 Since the previous tracing, no significant change noted

 

DOCTOR:   Sheridan Mayes  Interpretating Date/Time  03/19/2018 11:51:37